# Patient Record
Sex: FEMALE | Race: WHITE | NOT HISPANIC OR LATINO | Employment: FULL TIME | URBAN - METROPOLITAN AREA
[De-identification: names, ages, dates, MRNs, and addresses within clinical notes are randomized per-mention and may not be internally consistent; named-entity substitution may affect disease eponyms.]

---

## 2017-04-04 ENCOUNTER — ALLSCRIPTS OFFICE VISIT (OUTPATIENT)
Dept: OTHER | Facility: OTHER | Age: 67
End: 2017-04-04

## 2017-05-02 ENCOUNTER — HOSPITAL ENCOUNTER (OUTPATIENT)
Dept: RADIOLOGY | Facility: CLINIC | Age: 67
Discharge: HOME | End: 2017-05-02
Payer: COMMERCIAL

## 2017-05-02 DIAGNOSIS — Z13.820 ENCOUNTER FOR SCREENING FOR OSTEOPOROSIS: ICD-10-CM

## 2017-05-02 DIAGNOSIS — Z12.31 ENCOUNTER FOR SCREENING MAMMOGRAM FOR HIGH-RISK PATIENT: ICD-10-CM

## 2017-05-02 PROCEDURE — G0202 SCR MAMMO BI INCL CAD: HCPCS

## 2017-05-02 PROCEDURE — 77080 DXA BONE DENSITY AXIAL: CPT

## 2017-11-08 ENCOUNTER — ALLSCRIPTS OFFICE VISIT (OUTPATIENT)
Dept: OTHER | Facility: OTHER | Age: 67
End: 2017-11-08

## 2017-11-08 ENCOUNTER — APPOINTMENT (OUTPATIENT)
Dept: RADIOLOGY | Facility: CLINIC | Age: 67
End: 2017-11-08
Payer: COMMERCIAL

## 2017-11-08 DIAGNOSIS — R10.2 PELVIC AND PERINEAL PAIN: ICD-10-CM

## 2017-11-08 PROCEDURE — 72170 X-RAY EXAM OF PELVIS: CPT

## 2017-11-10 NOTE — PROGRESS NOTES
Assessment    1  Pelvic pain in female (625 9) (R10 2)    Plan  Pelvic pain    · * XR PELVIS AP ONLY 1 OR 2 VIEW; Status:Active - Retrospective By ProtocolAuthorization; Requested MVQ:[id number];   Prakash Mcgrath does have pain directly over the pubic symphysis and has increased sclerosis on x-ray there  I am concerned about a pubic symphisitis  I would like to have an MRI to confirm this  We did talk about possible need for steroid injection to this region as well as the much less likely need for surgical intervention there  She will follow up after the MRI has been done and read  Discussion/Summary  The patient was counseled regarding diagnostic results,-- instructions for management,-- prognosis,-- patient and family education,-- impressions,-- risks and benefits of treatment options  Chief Complaint    1  Pelvic Pain    History of Present Illness  Prakash Mcgrath is a 52-year-old female who has been complaining about pelvic pain along the anterior aspect for the past 3 or 4 months  She does not recall any trauma  She is referred to see me today for the development of this pain  She did see her gynecologist who did an ultrasound that demonstrated no obvious abnormality into her region of the ovaries or other gynecologic problems  She states that the pain is a mild and dull ache that is intermittent  It radiates across the lower aspect of the abdomen  She does not know anything that makes it better per se although putting pressure along the lower abdomen at the pelvis does seem to bother her  She also has a history of cramps into her lower extremities  This is something that has been ongoing for quite some time and we did talk about some treatment methods for that  A would include appropriate hydration and adequate potassium intake and addition some quinine sulfate via tonic water to her nightly routine  If those do not help, we did talk about seeing her primary care physician for metabolic workup     Review of Systems   Constitutional: No fever, no chills, feels well, no tiredness, no recent weight gain or loss  Eyes: No complaints of eyesight problems, no red eyes  ENT: no loss of hearing, no nosebleeds, no sore throat  Cardiovascular: No complaints of chest pain, no palpitations, no leg claudication or lower extremity edema  Respiratory: no compliants of shortness of breath, no wheezing, no cough  Gastrointestinal: no complaints of abdominal pain, no constipation, no nausea or diarrhea, no vomiting, no bloody stools  Genitourinary: no complaints of dysuria, no incontinence  Musculoskeletal: as noted in HPI  Integumentary: no complaints of skin rash or lesion, no itching or dry skin, no skin wounds  Neurological: no complaints of headache, no confusion, no numbness or tingling, no dizziness  Endocrine: No complaints of muscle weakness, no feelings of weakness, no frequent urination, no excessive thirst   Psychiatric: No suicidal thoughts, no anxiety, no feelings of depression  Active Problems    1  Arthropathy (716 90) (M12 9)   2  Benign colon polyp (211 3) (K63 5)   3  BMI 26 0-26 9,adult (V85 22) (Z68 26)   4  Chest pain (786 50) (R07 9)   5  Colon, diverticulosis (562 10) (K57 30)   6  Constipation (564 00) (K59 00)   7  Encounter for screening mammogram for malignant neoplasm of breast (V76 12) (Z12 31)   8  GERD (gastroesophageal reflux disease) (530 81) (K21 9)   9  Internal Hemorrhoids (455 0)   10  Lumbago (724 2) (M54 5)   11  Mammogram abnormal (793 80) (R92 8)   12  Osteoarthrosis of hip and thigh region (715 95) (M16 9)   13  Pelvic pain (R10 2)   14  Residual hemorrhoidal skin tags (455 9) (K64 4)   15  Screening for genitourinary condition (V81 6) (Z13 89)   16  Seborrheic keratosis (702 19) (L82 1)   17  Trigger finger (727 03) (M65 30)   18   Uterovaginal prolapse (618 4) (N81 4)    Past Medical History   · Acute sinusitis (461 9) (J01 90)   · Acute upper respiratory infection (465 9) (J06 9)   · History of Bursitis of hip, unspecified laterality   · History of Concussion (V15 52)   · History of Contusion Of The Left Side Of The Face With Intact Skin Surface (920)   · Cough (786 2) (R05)   · Dog Bite (E906 0)   · History of acute sinusitis (V12 69) (Z87 09)   · History of backache (V13 59) (Z87 39)   · History of bursitis (V13 59) (Z87 39)   · History of Nonallopathic lesion of upper extremities (739 7) (M99 9)   · Open Wound Of The Hand (882 0)   · Well adult on routine health check (V70 0) (Z00 00)    The active problems and past medical history were reviewed and updated today  Surgical History   · History of Appendectomy   · History of Dilation And Curettage   · History of Hand Incision Tendon Sheath Of A Finger   · History of Tonsillectomy    The surgical history was reviewed and updated today  Family History  Mother    · No pertinent family history  Paternal Grandfather    · Family history of malignant neoplasm (V16 9) (Z80 9)  Maternal Aunt    · Family history of malignant neoplasm (V16 9) (Z80 9)  Maternal Uncle    · Family history of malignant neoplasm (V16 9) (Z80 9)    The family history was reviewed and updated today  Social History     · Denied: History of Alcohol use   · Never A Smoker  The social history was reviewed and updated today  Current Meds   1  Biotin TABS; Therapy: (Recorded:08Nov2017) to Recorded   2  Citracal TABS; Therapy: (Recorded:08Nov2017) to Recorded   3  Multiple Vitamin TABS; Therapy: (0472 51 11 42) to Recorded   4  Vitamin D3 CAPS; Therapy: (0472 51 11 42) to Recorded    The medication list was reviewed and updated today  Allergies    1  No Known Drug Allergies    Physical Exam    Right Hip: Appearance: Normal  Tenderness: pubic symphysis  ROM:  Full  Motor: Normal  Special Tests: negative Straight Leg Raise    Constitutional - General appearance: Normal   Musculoskeletal - Gait and station: Normal -- Digits and nails: Normal -- Muscle strength/tone: Normal -- Lower extremity compartments: Normal   Cardiovascular - Pulses: Normal -- Examination of extremities for edema and/or varicosities: Normal   Lymphatic - Palpation of lymph nodes in groin: Normal   Skin - Skin and subcutaneous tissue: Normal   Neurologic - Sensation: Normal -- Lower extremity peripheral neuro exam: Normal   Psychiatric - Orientation to person, place, and time: Normal -- Mood and affect: Normal   Eyes  Conjunctiva and lids: Normal    Pupils and irises: Normal        Results/Data  I personally reviewed the films/images/results in the office today  My interpretation follows  X-ray Review X-ray of the pelvis demonstrates increased sclerosis at the pubic symphysis        Signatures   Electronically signed by : RAKESH Kirklnad ; Nov 8 2017  5:20PM EST                       (Author)

## 2017-11-25 ENCOUNTER — HOSPITAL ENCOUNTER (OUTPATIENT)
Dept: RADIOLOGY | Facility: HOSPITAL | Age: 67
Discharge: HOME | End: 2017-11-25
Attending: ORTHOPAEDIC SURGERY
Payer: COMMERCIAL

## 2017-11-25 DIAGNOSIS — R10.2 PELVIC AND PERINEAL PAIN: ICD-10-CM

## 2017-11-25 PROCEDURE — 72195 MRI PELVIS W/O DYE: CPT

## 2018-01-13 NOTE — RESULT NOTES
Verified Results  * XR CHEST PA & LATERAL [id number] 10:58AM Lieutenant Harris     Test Name Result Flag Reference   XR CHEST PA & LATERAL (Report)     CHEST      INDICATION: Posterior right-sided chest pain  COMPARISON: 8/28/2008     VIEWS: Frontal and lateral projections; 2 images     FINDINGS:        Cardiomediastinal silhouette appears stable  The lungs are clear  No pneumothorax or pleural effusion  Visualized osseous structures appear within normal limits for the patient's age  IMPRESSION:     No active pulmonary disease  Workstation performed: [id number]     Signed by:    Heather Hooks MD   9/26/16       Discussion/Summary   Your chest xray is normal  - Dr Garcia Floyd

## 2018-01-14 VITALS
WEIGHT: 144 LBS | DIASTOLIC BLOOD PRESSURE: 70 MMHG | BODY MASS INDEX: 27.19 KG/M2 | SYSTOLIC BLOOD PRESSURE: 116 MMHG | TEMPERATURE: 99 F | RESPIRATION RATE: 14 BRPM | HEART RATE: 72 BPM | HEIGHT: 61 IN

## 2018-04-20 ENCOUNTER — TRANSCRIBE ORDERS (OUTPATIENT)
Dept: ADMINISTRATIVE | Facility: HOSPITAL | Age: 68
End: 2018-04-20

## 2018-04-20 DIAGNOSIS — Z12.39 BREAST SCREENING: Primary | ICD-10-CM

## 2018-05-03 ENCOUNTER — TRANSCRIBE ORDERS (OUTPATIENT)
Dept: ADMINISTRATIVE | Facility: HOSPITAL | Age: 68
End: 2018-05-03

## 2018-05-03 DIAGNOSIS — Z12.39 SCREENING BREAST EXAMINATION: Primary | ICD-10-CM

## 2018-05-07 ENCOUNTER — HOSPITAL ENCOUNTER (OUTPATIENT)
Dept: RADIOLOGY | Facility: HOSPITAL | Age: 68
Discharge: HOME | End: 2018-05-07
Payer: COMMERCIAL

## 2018-05-07 DIAGNOSIS — Z12.39 SCREENING BREAST EXAMINATION: ICD-10-CM

## 2018-05-07 PROCEDURE — 77067 SCR MAMMO BI INCL CAD: CPT

## 2018-05-07 PROCEDURE — 77063 BREAST TOMOSYNTHESIS BI: CPT

## 2018-07-12 ENCOUNTER — OFFICE VISIT (OUTPATIENT)
Dept: FAMILY MEDICINE CLINIC | Facility: CLINIC | Age: 68
End: 2018-07-12
Payer: COMMERCIAL

## 2018-07-12 VITALS
BODY MASS INDEX: 26.17 KG/M2 | WEIGHT: 138.6 LBS | HEART RATE: 76 BPM | DIASTOLIC BLOOD PRESSURE: 80 MMHG | HEIGHT: 61 IN | SYSTOLIC BLOOD PRESSURE: 120 MMHG | RESPIRATION RATE: 16 BRPM | TEMPERATURE: 98.1 F

## 2018-07-12 DIAGNOSIS — H81.10 BENIGN PAROXYSMAL POSITIONAL VERTIGO, UNSPECIFIED LATERALITY: Primary | ICD-10-CM

## 2018-07-12 DIAGNOSIS — K57.30 COLON, DIVERTICULOSIS: ICD-10-CM

## 2018-07-12 DIAGNOSIS — Z13.89 SCREENING FOR CARDIOVASCULAR, RESPIRATORY, AND GENITOURINARY DISEASES: ICD-10-CM

## 2018-07-12 DIAGNOSIS — Z13.6 SCREENING FOR CARDIOVASCULAR, RESPIRATORY, AND GENITOURINARY DISEASES: ICD-10-CM

## 2018-07-12 DIAGNOSIS — Z13.1 SCREENING FOR DIABETES MELLITUS (DM): ICD-10-CM

## 2018-07-12 DIAGNOSIS — M81.0 OSTEOPOROSIS, UNSPECIFIED OSTEOPOROSIS TYPE, UNSPECIFIED PATHOLOGICAL FRACTURE PRESENCE: ICD-10-CM

## 2018-07-12 DIAGNOSIS — R53.83 FATIGUE, UNSPECIFIED TYPE: ICD-10-CM

## 2018-07-12 DIAGNOSIS — Z13.83 SCREENING FOR CARDIOVASCULAR, RESPIRATORY, AND GENITOURINARY DISEASES: ICD-10-CM

## 2018-07-12 PROBLEM — R10.2 PELVIC PAIN IN FEMALE: Status: ACTIVE | Noted: 2017-11-08

## 2018-07-12 PROBLEM — M86.9 OSTEITIS PUBIS (HCC): Status: ACTIVE | Noted: 2017-12-12

## 2018-07-12 PROCEDURE — 3725F SCREEN DEPRESSION PERFORMED: CPT | Performed by: FAMILY MEDICINE

## 2018-07-12 PROCEDURE — 99214 OFFICE O/P EST MOD 30 MIN: CPT | Performed by: FAMILY MEDICINE

## 2018-07-12 RX ORDER — CYANOCOBALAMIN (VITAMIN B-12) 1000 MCG
TABLET, EXTENDED RELEASE ORAL
COMMUNITY

## 2018-07-12 RX ORDER — DIMENHYDRINATE 50 MG/1
50 TABLET ORAL
COMMUNITY
End: 2018-07-12

## 2018-07-12 RX ORDER — MECLIZINE HYDROCHLORIDE 25 MG/1
25 TABLET ORAL EVERY 8 HOURS PRN
Qty: 30 TABLET | Refills: 0 | Status: SHIPPED | OUTPATIENT
Start: 2018-07-12 | End: 2018-11-28

## 2018-07-12 RX ORDER — GLUCOSAMINE/D3/BOSWELLIA SERRA 1500MG-400
TABLET ORAL DAILY
COMMUNITY

## 2018-07-12 NOTE — PROGRESS NOTES
Assessment/Plan:    No problem-specific Assessment & Plan notes found for this encounter  She can contact me for referral to [facility] if no improvement with meds and time  Cranial nerves normal  hallpike equivocal  Diverticulosis stable  phm labs offered and cpe suggested  Osteoporosis advised, she was unaware, suggest weight bearing exercise and CA and D and q2y Dexa  IPPE suggested     Diagnoses and all orders for this visit:    Benign paroxysmal positional vertigo, unspecified laterality  -     meclizine (ANTIVERT) 25 mg tablet; Take 1 tablet (25 mg total) by mouth every 8 (eight) hours as needed for dizziness    Screening for diabetes mellitus (DM)  -     Comprehensive metabolic panel; Future    Screening for cardiovascular, respiratory, and genitourinary diseases  -     Lipid Panel with Direct LDL reflex; Future    Fatigue, unspecified type  -     TSH, 3rd generation; Future    Osteoporosis, unspecified osteoporosis type, unspecified pathological fracture presence    BMI 26 0-26 9,adult    Colon, diverticulosis    Other orders  -     Biotin 10 MG TABS; Take by mouth  -     Calcium Citrate-Vitamin D (CITRACAL MAXIMUM) 315-250 MG-UNIT TABS; Take by mouth  -     Multiple Vitamin-Folic Acid TABS; Take by mouth  -     Cholecalciferol (VITAMIN D3) 1000 units CAPS; Take by mouth  -     Discontinue: dimenhyDRINATE (DRAMAMINE) 50 mg tablet; Take 50 mg by mouth daily at bedtime as needed for movement disorders              Return if symptoms worsen or fail to improve, for welcome to medicare visit  Subjective:      Patient ID: Cyndi Rivera is a 78 y o  female      Chief Complaint   Patient presents with    Dizziness      for 4 days prcma    Nausea       HPI  Dizziness  Off balance  Nausea  Slight trouble walking  Ok laying down  Sitting still ok  Feels upon rising, then continuously  No vomit  Dramamine tried  Not groggy from it  Better then next am  Less severe  Better today and did not   No recent uri or travel or allergies or congestion  Not with rolling over  The following portions of the patient's history were reviewed and updated as appropriate: allergies, current medications, past family history, past medical history, past social history, past surgical history and problem list     Review of Systems   Constitutional: Negative for chills and fever  Cardiovascular: Negative for chest pain and palpitations  Neurological: Positive for dizziness  Negative for syncope, speech difficulty and weakness  Current Outpatient Prescriptions   Medication Sig Dispense Refill    Biotin 10 MG TABS Take by mouth      Calcium Citrate-Vitamin D (CITRACAL MAXIMUM) 315-250 MG-UNIT TABS Take by mouth      Cholecalciferol (VITAMIN D3) 1000 units CAPS Take by mouth      Multiple Vitamin-Folic Acid TABS Take by mouth      meclizine (ANTIVERT) 25 mg tablet Take 1 tablet (25 mg total) by mouth every 8 (eight) hours as needed for dizziness 30 tablet 0     No current facility-administered medications for this visit  Objective:    /80   Pulse 76   Temp 98 1 °F (36 7 °C)   Resp 16   Ht 5' 0 5" (1 537 m)   Wt 62 9 kg (138 lb 9 6 oz)   BMI 26 62 kg/m²        Physical Exam   Constitutional: She appears well-developed  No distress  HENT:   Head: Normocephalic  Mouth/Throat: No oropharyngeal exudate  Eyes: Conjunctivae are normal  No scleral icterus  Neck: Neck supple  Cardiovascular: Normal rate and intact distal pulses  Exam reveals no friction rub  No murmur heard  Pulmonary/Chest: Effort normal  No respiratory distress  She has no wheezes  Abdominal: Soft  She exhibits no distension  There is no tenderness  Musculoskeletal: She exhibits no edema or deformity  Lymphadenopathy:     She has no cervical adenopathy  Neurological: She is alert  She has normal reflexes  She displays normal reflexes  No cranial nerve deficit  She exhibits normal muscle tone   Coordination normal    Skin: Skin is warm and dry  No rash noted  No pallor  Psychiatric: Her behavior is normal  Thought content normal    Nursing note and vitals reviewed               Jalen Roberson DO

## 2018-07-19 ENCOUNTER — OFFICE VISIT (OUTPATIENT)
Dept: FAMILY MEDICINE CLINIC | Facility: CLINIC | Age: 68
End: 2018-07-19
Payer: COMMERCIAL

## 2018-07-19 ENCOUNTER — TELEPHONE (OUTPATIENT)
Dept: FAMILY MEDICINE CLINIC | Facility: CLINIC | Age: 68
End: 2018-07-19

## 2018-07-19 VITALS
HEART RATE: 72 BPM | SYSTOLIC BLOOD PRESSURE: 112 MMHG | DIASTOLIC BLOOD PRESSURE: 82 MMHG | BODY MASS INDEX: 27.09 KG/M2 | WEIGHT: 138 LBS | TEMPERATURE: 98.4 F | HEIGHT: 60 IN | RESPIRATION RATE: 16 BRPM

## 2018-07-19 DIAGNOSIS — R42 VERTIGO: Primary | ICD-10-CM

## 2018-07-19 PROCEDURE — 99214 OFFICE O/P EST MOD 30 MIN: CPT | Performed by: NURSE PRACTITIONER

## 2018-07-19 NOTE — TELEPHONE ENCOUNTER
Jeny Rhodes saw Dr Carmelina Anderson last week for disoriented, nauseas feeling  When she had apt her symptoms went away and now they are back  She feels like her insides are shaking and just not right  Before making an apt she wanted to ask Dr Carmelina Anderson if she should be seen?     Please call Jeny Rhodes at [phone]    [facility]

## 2018-07-19 NOTE — TELEPHONE ENCOUNTER
Pt sts was in last Thursday with Dr YAN, Monday into Tuesday patient was feeling disoriented and nauseous  Today patient feels her insides are shaking, not shaking on the outside  Unable to focus  Pt currently at [facility] house "just don't feel right "   Not SOB   Not nauseous not vomiting   Abdomin not distend, BM today  Appt made with Cher  Sending to review

## 2018-07-19 NOTE — PROGRESS NOTES
Assessment/Plan:  Total time of encounter was 30 minutes and 20 minutes was spent counseling  Will try antivert and will do blood work and will consider balance center if no improvement with antivert  Discussed in detail sign and symptoms for TIA and heart disease  Supportive care discussed and advised  Follow up for no improvement and worsening of conditions  Patient advised and educated when to see immediate medical care  Go to er for any chest pain, headache, shortness of breath, dizziness, weakness of extremities  Diagnoses and all orders for this visit:    Vertigo  Comments: Will start antivert and will do blood work  BMI 26 0-26 9,adult          Return if symptoms worsen or fail to improve  Subjective:      Patient ID: Mary Bernabe is a 78 y o  female  Chief Complaint   Patient presents with    Follow-up     not better feeling regan rosario lpn       HPI   Patient was seen on 7/12 by Dr Yomi Mckay for vertigo and was nauseated and stated that feels better than before but still feeling her balance is off and feels light headed  Stated that never started antivert and never went for the blood work  Denies any headache, dizziness, vision changed, hearing changes, chest pain and sob  Currently not on any prescription medication  Stated that lightheadedness does not relate to any position  Denies any airplane rides and cruise trips      The following portions of the patient's history were reviewed and updated as appropriate: allergies, current medications, past family history, past medical history, past social history, past surgical history and problem list       Review of Systems   Constitutional: Negative  HENT: Negative  Respiratory: Negative  Cardiovascular: Negative  Gastrointestinal: Negative  Genitourinary: Negative  Musculoskeletal: Negative  Skin: Negative  Neurological: Positive for light-headedness   Negative for dizziness, tremors, seizures, syncope, facial asymmetry, speech difficulty, weakness, numbness and headaches  Psychiatric/Behavioral: Negative  Objective:    History   Smoking Status    Never Smoker   Smokeless Tobacco    Never Used       Allergies: No Known Allergies    Vitals:  /82   Pulse 72   Temp 98 4 °F (36 9 °C)   Resp 16   Ht 5' 0 05" (1 525 m)   Wt 62 6 kg (138 lb)   BMI 26 91 kg/m²     Current Outpatient Prescriptions   Medication Sig Dispense Refill    Biotin 10 MG TABS Take by mouth      Calcium Citrate-Vitamin D (CITRACAL MAXIMUM) 315-250 MG-UNIT TABS Take by mouth      Cholecalciferol (VITAMIN D3) 1000 units CAPS Take by mouth      meclizine (ANTIVERT) 25 mg tablet Take 1 tablet (25 mg total) by mouth every 8 (eight) hours as needed for dizziness 30 tablet 0    Multiple Vitamin-Folic Acid TABS Take by mouth       No current facility-administered medications for this visit  Physical Exam   Constitutional: She is oriented to person, place, and time  She appears well-developed and well-nourished  HENT:   Head: Normocephalic  Right Ear: Tympanic membrane, external ear and ear canal normal    Left Ear: Tympanic membrane, external ear and ear canal normal    Nose: Nose normal  Right sinus exhibits no maxillary sinus tenderness and no frontal sinus tenderness  Left sinus exhibits no maxillary sinus tenderness and no frontal sinus tenderness  Mouth/Throat: Oropharynx is clear and moist and mucous membranes are normal    Eyes: Conjunctivae and lids are normal  Pupils are equal, round, and reactive to light  Neck: Normal range of motion  Neck supple  Cardiovascular: Normal rate, regular rhythm and normal heart sounds  Pulmonary/Chest: Effort normal and breath sounds normal    Abdominal: Soft  Bowel sounds are normal    Musculoskeletal: Normal range of motion  She exhibits no edema, tenderness or deformity     Lymphadenopathy:        Right cervical: No superficial cervical and no posterior cervical adenopathy present  Left cervical: No superficial cervical and no posterior cervical adenopathy present  Right: No inguinal and no supraclavicular adenopathy present  Left: No inguinal and no supraclavicular adenopathy present  Neurological: She is alert and oriented to person, place, and time  She has normal strength and normal reflexes  No cranial nerve deficit or sensory deficit  She displays a negative Romberg sign  Coordination and gait normal  GCS eye subscore is 4  GCS verbal subscore is 5  GCS motor subscore is 6  Verena Hallpike maneuver negative for nystagmus   Skin: Skin is warm and dry  Psychiatric: She has a normal mood and affect  Her speech is normal and behavior is normal  Judgment and thought content normal  Cognition and memory are normal    Vitals reviewed          Erminio Leventhal, CRNP

## 2018-07-19 NOTE — PATIENT INSTRUCTIONS
Will try antivert and will do blood work  Supportive care discussed and advised  Follow up for no improvement and worsening of conditions  Patient advised and educated when to see immediate medical care  Go to er for any chest pain, headache, shortness of breath, dizziness, weakness of extremities

## 2018-07-21 LAB
ALBUMIN SERPL-MCNC: 4.5 G/DL (ref 3.6–4.8)
ALBUMIN/GLOB SERPL: 1.7 {RATIO} (ref 1.2–2.2)
ALP SERPL-CCNC: 74 IU/L (ref 39–117)
ALT SERPL-CCNC: 17 IU/L (ref 0–32)
AST SERPL-CCNC: 19 IU/L (ref 0–40)
BILIRUB SERPL-MCNC: 0.4 MG/DL (ref 0–1.2)
BUN SERPL-MCNC: 26 MG/DL (ref 8–27)
BUN/CREAT SERPL: 30 (ref 12–28)
CALCIUM SERPL-MCNC: 9.7 MG/DL (ref 8.7–10.3)
CHLORIDE SERPL-SCNC: 102 MMOL/L (ref 96–106)
CHOLEST SERPL-MCNC: 254 MG/DL (ref 100–199)
CO2 SERPL-SCNC: 23 MMOL/L (ref 20–29)
CREAT SERPL-MCNC: 0.87 MG/DL (ref 0.57–1)
GLOBULIN SER-MCNC: 2.7 G/DL (ref 1.5–4.5)
GLUCOSE SERPL-MCNC: 89 MG/DL (ref 65–99)
HDLC SERPL-MCNC: 58 MG/DL
LDLC SERPL CALC-MCNC: 174 MG/DL (ref 0–99)
MICRODELETION SYND BLD/T FISH: NORMAL
POTASSIUM SERPL-SCNC: 4.8 MMOL/L (ref 3.5–5.2)
PROT SERPL-MCNC: 7.2 G/DL (ref 6–8.5)
SL AMB EGFR AFRICAN AMERICAN: 80 ML/MIN/1.73
SL AMB EGFR NON AFRICAN AMERICAN: 70 ML/MIN/1.73
SODIUM SERPL-SCNC: 143 MMOL/L (ref 134–144)
TRIGL SERPL-MCNC: 111 MG/DL (ref 0–149)
TSH SERPL DL<=0.005 MIU/L-ACNC: 3.86 UIU/ML (ref 0.45–4.5)

## 2018-07-24 DIAGNOSIS — E78.2 MIXED HYPERLIPIDEMIA: Primary | ICD-10-CM

## 2018-07-24 RX ORDER — ATORVASTATIN CALCIUM 20 MG/1
20 TABLET, FILM COATED ORAL DAILY
Qty: 30 TABLET | Refills: 2 | Status: SHIPPED | OUTPATIENT
Start: 2018-07-24 | End: 2019-01-02

## 2018-09-05 LAB
ALBUMIN SERPL-MCNC: 4.4 G/DL (ref 3.6–4.8)
ALBUMIN/GLOB SERPL: 1.7 {RATIO} (ref 1.2–2.2)
ALP SERPL-CCNC: 63 IU/L (ref 39–117)
ALT SERPL-CCNC: 17 IU/L (ref 0–32)
AST SERPL-CCNC: 23 IU/L (ref 0–40)
BILIRUB SERPL-MCNC: 0.2 MG/DL (ref 0–1.2)
BUN SERPL-MCNC: 21 MG/DL (ref 8–27)
BUN/CREAT SERPL: 28 (ref 12–28)
CALCIUM SERPL-MCNC: 9.7 MG/DL (ref 8.7–10.3)
CHLORIDE SERPL-SCNC: 102 MMOL/L (ref 96–106)
CO2 SERPL-SCNC: 22 MMOL/L (ref 20–29)
CREAT SERPL-MCNC: 0.76 MG/DL (ref 0.57–1)
GLOBULIN SER-MCNC: 2.6 G/DL (ref 1.5–4.5)
GLUCOSE SERPL-MCNC: 76 MG/DL (ref 65–99)
POTASSIUM SERPL-SCNC: 4.8 MMOL/L (ref 3.5–5.2)
PROT SERPL-MCNC: 7 G/DL (ref 6–8.5)
SL AMB EGFR AFRICAN AMERICAN: 95 ML/MIN/1.73
SL AMB EGFR NON AFRICAN AMERICAN: 82 ML/MIN/1.73
SODIUM SERPL-SCNC: 140 MMOL/L (ref 134–144)

## 2018-10-25 LAB
CHOLEST SERPL-MCNC: 251 MG/DL (ref 100–199)
HDLC SERPL-MCNC: 56 MG/DL
LDLC SERPL CALC-MCNC: 171 MG/DL (ref 0–99)
MICRODELETION SYND BLD/T FISH: NORMAL
TRIGL SERPL-MCNC: 119 MG/DL (ref 0–149)

## 2018-11-28 ENCOUNTER — OFFICE VISIT (OUTPATIENT)
Dept: OBGYN CLINIC | Facility: CLINIC | Age: 68
End: 2018-11-28
Payer: COMMERCIAL

## 2018-11-28 VITALS
WEIGHT: 132.8 LBS | SYSTOLIC BLOOD PRESSURE: 114 MMHG | HEART RATE: 66 BPM | HEIGHT: 61 IN | BODY MASS INDEX: 25.07 KG/M2 | DIASTOLIC BLOOD PRESSURE: 76 MMHG

## 2018-11-28 DIAGNOSIS — S39.012A STRAIN OF LUMBAR REGION, INITIAL ENCOUNTER: Primary | ICD-10-CM

## 2018-11-28 PROCEDURE — 99214 OFFICE O/P EST MOD 30 MIN: CPT | Performed by: ORTHOPAEDIC SURGERY

## 2018-11-28 NOTE — PROGRESS NOTES
Assessment/Plan:  1  Strain of lumbar region, initial encounter  Ambulatory referral to Physical Therapy       Armando Harris suffering with mechanical low back pain  I am reassured by the fact she has no neurologic complaints or exam findings today  We will start with conservative treatment for this  She was given a script for physical therapy we did advise her to do her exercises at home on her off days  If she fails to progress over the next 4-6 weeks she will certainly return  If she starts developing any pain, numbness, or weakness of the lower extremities she will call us immediately  Subjective:   Jesus Proctor is a 80 y o  female who presents today for evaluation of her low back  She states that about a week ago she started with right-sided low back pain which has persisted has not gotten significantly worse  She denies any injury prior to the onset of her symptoms  He she again notes right-sided low back pain which seems to be worse when she is sitting for prolonged  At times, like long car rides or at night  During the day when she is moving around she does not have much discomfort  She notes good range of motion of back and good strength  She denies any paresthesias or weakness of the lower extremities  She denies any bowel or bladder incontinence  Review of Systems   Constitutional: Negative for chills, fever and unexpected weight change  HENT: Negative for hearing loss, nosebleeds and sore throat  Eyes: Negative for pain, redness and visual disturbance  Respiratory: Negative for cough, shortness of breath and wheezing  Cardiovascular: Negative for chest pain, palpitations and leg swelling  Gastrointestinal: Negative for abdominal pain, nausea and vomiting  Endocrine: Negative for polydipsia and polyuria  Genitourinary: Negative for dysuria and hematuria  Musculoskeletal:        See HPI   Skin: Negative for rash and wound     Neurological: Negative for dizziness, numbness and headaches  Psychiatric/Behavioral: Negative for decreased concentration and suicidal ideas  The patient is not nervous/anxious  Past Medical History:   Diagnosis Date    Concussion     Last Assessed: 12/26/2013    Nonallopathic lesion of upper extremities 07/03/2008    Other bursitis of hip, unspecified hip 04/09/2008       Past Surgical History:   Procedure Laterality Date    APPENDECTOMY      DILATION AND CURETTAGE OF UTERUS      INCISION TENDON SHEATH HAND      Finger    TONSILLECTOMY         Family History   Problem Relation Age of Onset    No Known Problems Mother     Cancer Paternal Grandfather     Cancer Maternal Aunt     Cancer Maternal Uncle        Social History     Occupational History    Not on file  Social History Main Topics    Smoking status: Never Smoker    Smokeless tobacco: Never Used    Alcohol use Yes      Comment: rare / Denied history of use per [employer]    Drug use: No    Sexual activity: Not on file         Current Outpatient Prescriptions:     Biotin 10 MG TABS, Take by mouth, Disp: , Rfl:     Calcium Citrate-Vitamin D (CITRACAL MAXIMUM) 315-250 MG-UNIT TABS, Take by mouth, Disp: , Rfl:     Cholecalciferol (VITAMIN D3) 1000 units CAPS, Take by mouth, Disp: , Rfl:     Multiple Vitamin-Folic Acid TABS, Take by mouth, Disp: , Rfl:     atorvastatin (LIPITOR) 20 mg tablet, Take 1 tablet (20 mg total) by mouth daily for 90 days (Patient not taking: Reported on 11/28/2018 ), Disp: 30 tablet, Rfl: 2    No Known Allergies    Objective:  Vitals:    11/28/18 1443   BP: 114/76   Pulse: 66       Ortho Exam    Physical Exam   Constitutional: She is oriented to person, place, and time  She appears well-developed and well-nourished  No distress  HENT:   Head: Normocephalic and atraumatic  Eyes: Conjunctivae and EOM are normal  No scleral icterus  Neck: No JVD present  Cardiovascular: Normal rate and intact distal pulses      Pulmonary/Chest: Effort normal  No respiratory distress  Abdominal: Soft  She exhibits no distension  Neurological: She is alert and oriented to person, place, and time  Coordination normal    Skin: Skin is warm  Psychiatric: She has a normal mood and affect  Lumbar spine:   Right paraspinal tenderness  No midline tenderness to the lumbar spine or left paraspinal tenderness  Full range of motion back  Sensation intact L2 through S1 dermatomes bilaterally  5/5 strength lower extremities

## 2019-01-02 ENCOUNTER — OFFICE VISIT (OUTPATIENT)
Dept: FAMILY MEDICINE CLINIC | Facility: CLINIC | Age: 69
End: 2019-01-02
Payer: COMMERCIAL

## 2019-01-02 VITALS
HEIGHT: 61 IN | RESPIRATION RATE: 16 BRPM | BODY MASS INDEX: 24.51 KG/M2 | SYSTOLIC BLOOD PRESSURE: 120 MMHG | HEART RATE: 66 BPM | WEIGHT: 129.8 LBS | DIASTOLIC BLOOD PRESSURE: 82 MMHG | TEMPERATURE: 97.7 F

## 2019-01-02 DIAGNOSIS — Z91.89 FRAMINGHAM CARDIAC RISK 10-20% IN NEXT 10 YEARS: ICD-10-CM

## 2019-01-02 DIAGNOSIS — H81.10 BENIGN PAROXYSMAL POSITIONAL VERTIGO, UNSPECIFIED LATERALITY: ICD-10-CM

## 2019-01-02 DIAGNOSIS — Z00.00 MEDICARE ANNUAL WELLNESS VISIT, INITIAL: Primary | ICD-10-CM

## 2019-01-02 DIAGNOSIS — J01.00 ACUTE NON-RECURRENT MAXILLARY SINUSITIS: ICD-10-CM

## 2019-01-02 DIAGNOSIS — M81.0 OSTEOPOROSIS, UNSPECIFIED OSTEOPOROSIS TYPE, UNSPECIFIED PATHOLOGICAL FRACTURE PRESENCE: ICD-10-CM

## 2019-01-02 PROCEDURE — G0438 PPPS, INITIAL VISIT: HCPCS | Performed by: FAMILY MEDICINE

## 2019-01-02 PROCEDURE — 99214 OFFICE O/P EST MOD 30 MIN: CPT | Performed by: FAMILY MEDICINE

## 2019-01-02 PROCEDURE — 1170F FXNL STATUS ASSESSED: CPT | Performed by: FAMILY MEDICINE

## 2019-01-02 PROCEDURE — 1125F AMNT PAIN NOTED PAIN PRSNT: CPT | Performed by: FAMILY MEDICINE

## 2019-01-02 RX ORDER — AMOXICILLIN AND CLAVULANATE POTASSIUM 875; 125 MG/1; MG/1
1 TABLET, FILM COATED ORAL EVERY 12 HOURS SCHEDULED
Qty: 20 TABLET | Refills: 0 | Status: SHIPPED | OUTPATIENT
Start: 2019-01-02 | End: 2019-01-12

## 2019-01-02 RX ORDER — BENZONATATE 200 MG/1
200 CAPSULE ORAL 3 TIMES DAILY PRN
Qty: 30 CAPSULE | Refills: 0 | Status: SHIPPED | OUTPATIENT
Start: 2019-01-02 | End: 2020-04-26

## 2019-01-02 NOTE — PROGRESS NOTES
Assessment/Plan:    No problem-specific Assessment & Plan notes found for this encounter  Sinusitis new  framingham score ok w/o statins  Diet for chol advised, f/u yearly  BPPV, had extensive testing in [facility], cardiac and CT head imaging and CTA, all neg per pt, offer  balance center vs ENT f/u that was recommended by ER  Osteoporosis noted on last dexa, due this year for 2y f/u advised, Ca/D/activity suggested  mammo utd, due in May 2019     Diagnoses and all orders for this visit:    Medicare annual wellness visit, initial    [place] cardiac risk 10-20% in next 10 years    Acute non-recurrent maxillary sinusitis  -     amoxicillin-clavulanate (AUGMENTIN) 875-125 mg per tablet; Take 1 tablet by mouth every 12 (twelve) hours for 10 days  -     benzonatate (TESSALON) 200 MG capsule; Take 1 capsule (200 mg total) by mouth 3 (three) times a day as needed for cough    Benign paroxysmal positional vertigo, unspecified laterality  -     Ambulatory referral to Physical Therapy; Future    Osteoporosis, unspecified osteoporosis type, unspecified pathological fracture presence              Return if symptoms worsen or fail to improve  Subjective:      Patient ID: Rex Lauren is a 80 y o  female  Chief Complaint   Patient presents with    Cough     prcma       HPI  Cough  Can't stop  Day and night  Over 4d  Irritating  Sinus congestion  Right neck sore/gland  No fever  Mucus is green  mucinex during day  Better then worse  nyquill  No one else sick at home  No c/d/n/v  Not sleeping well    osteoporosis on dexa May 2017  mammo utd May 2018    Declines flu shot     The following portions of the patient's history were reviewed and updated as appropriate: allergies, current medications, past family history, past medical history, past social history, past surgical history and problem list     Review of Systems   Cardiovascular: Negative for chest pain  Gastrointestinal: Negative for bowel incontinence  Neurological: Negative for syncope  Psychiatric/Behavioral: The patient is not nervous/anxious  Current Outpatient Prescriptions   Medication Sig Dispense Refill    Biotin 10 MG TABS Take by mouth      Calcium Citrate-Vitamin D (CITRACAL MAXIMUM) 315-250 MG-UNIT TABS Take by mouth      Cholecalciferol (VITAMIN D3) 1000 units CAPS Take by mouth      Multiple Vitamin-Folic Acid TABS Take by mouth      amoxicillin-clavulanate (AUGMENTIN) 875-125 mg per tablet Take 1 tablet by mouth every 12 (twelve) hours for 10 days 20 tablet 0    benzonatate (TESSALON) 200 MG capsule Take 1 capsule (200 mg total) by mouth 3 (three) times a day as needed for cough 30 capsule 0     No current facility-administered medications for this visit  Objective:    /82   Pulse 66   Temp 97 7 °F (36 5 °C)   Resp 16   Ht 5' 1" (1 549 m)   Wt 58 9 kg (129 lb 12 8 oz)   BMI 24 53 kg/m²        Physical Exam   Constitutional: She appears well-developed  No distress  HENT:   Head: Normocephalic  Mouth/Throat: No oropharyngeal exudate  Sinuses tender to percussion, nasal turbinates visualized and appear red and swollen     Eyes: Conjunctivae are normal  No scleral icterus  Neck: Neck supple  Carotid bruit is not present  Cardiovascular: Normal rate and intact distal pulses  No murmur heard  Pulmonary/Chest: Effort normal  No respiratory distress  She has no wheezes  Abdominal: Soft  She exhibits no distension  There is no tenderness  Musculoskeletal: She exhibits no edema or deformity  Lymphadenopathy:     She has no cervical adenopathy  Neurological: She is alert  She exhibits normal muscle tone  Skin: Skin is warm and dry  No rash noted  No pallor  Psychiatric: Her behavior is normal  Thought content normal    Nursing note and vitals reviewed               Garry Duane, DO  Assessment and Plan:    Problem List Items Addressed This Visit     Acute non-recurrent maxillary sinusitis Relevant Medications    amoxicillin-clavulanate (AUGMENTIN) 875-125 mg per tablet    benzonatate (TESSALON) 200 MG capsule    Benign paroxysmal positional vertigo    Relevant Orders    Ambulatory referral to Physical Therapy    [place] cardiac risk 10-20% in next 10 years    Osteoporosis      Other Visit Diagnoses     Medicare annual wellness visit, initial    -  Primary        Health Maintenance Due   Topic Date Due    Hepatitis C Screening  1950    Medicare Annual Wellness Visit (AWV)  1950    DTaP,Tdap,and Td Vaccines (1 - Tdap) 11/06/1972    Pneumococcal PPSV23/PCV13 65+ Years / High and Highest Risk (1 of 2 - PCV13) 11/06/2016    INFLUENZA VACCINE  07/01/2018         HPI:  Piyush Khan is a 80 y o  female here for her Subsequent Wellness Visit      Patient Active Problem List   Diagnosis    Arthropathy    Benign colon polyp    Colon, diverticulosis    Constipation    Internal hemorrhoids    Low back pain    Osteitis pubis (HCC)    Osteoarthritis of pelvis    Pelvic pain in female    Residual hemorrhoidal skin tags    Uterovaginal prolapse    Benign paroxysmal positional vertigo    Screening for diabetes mellitus (DM)    Screening for cardiovascular, respiratory, and genitourinary diseases    Fatigue    Osteoporosis    BMI 26 0-26 9,adult    Mixed hyperlipidemia    [place] cardiac risk 10-20% in next 10 years    Acute non-recurrent maxillary sinusitis     Past Medical History:   Diagnosis Date    Concussion     Last Assessed: 12/26/2013    Nonallopathic lesion of upper extremities 07/03/2008    Other bursitis of hip, unspecified hip 04/09/2008     Past Surgical History:   Procedure Laterality Date    APPENDECTOMY      DILATION AND CURETTAGE OF UTERUS      INCISION TENDON SHEATH HAND      Finger    TONSILLECTOMY       Family History   Problem Relation Age of Onset    No Known Problems Mother     Cancer Paternal Grandfather     Cancer Maternal Aunt     Cancer Maternal Uncle      History   Smoking Status    Never Smoker   Smokeless Tobacco    Never Used     History   Alcohol Use    Yes     Comment: rare / Denied history of use per [employer]      History   Drug Use No       Current Outpatient Prescriptions   Medication Sig Dispense Refill    Biotin 10 MG TABS Take by mouth      Calcium Citrate-Vitamin D (CITRACAL MAXIMUM) 315-250 MG-UNIT TABS Take by mouth      Cholecalciferol (VITAMIN D3) 1000 units CAPS Take by mouth      Multiple Vitamin-Folic Acid TABS Take by mouth      amoxicillin-clavulanate (AUGMENTIN) 875-125 mg per tablet Take 1 tablet by mouth every 12 (twelve) hours for 10 days 20 tablet 0    benzonatate (TESSALON) 200 MG capsule Take 1 capsule (200 mg total) by mouth 3 (three) times a day as needed for cough 30 capsule 0     No current facility-administered medications for this visit  No Known Allergies    There is no immunization history on file for this patient  Patient Care Team:  Sarah Baumann DO as PCP - General    Medicare Screening Tests and Risk Assessments:  Octavio Amin is here for her Initial Wellness visit  Health Risk Assessment:  Patient rates overall health as excellent  Patient feels that their physical health rating is Much better  Eyesight was rated as Same  Hearing was rated as Same  Patient feels that their emotional and mental health rating is Much better  Pain experienced by patient in the last 7 days has been None  Patient states that she has experienced no weight loss or gain in last 6 months  Emotional/Mental Health:  Patient has been feeling nervous/anxious  PHQ-9 Depression Screening:    Frequency of the following problems over the past two weeks:      1  Little interest or pleasure in doing things: 0 - not at all      2  Feeling down, depressed, or hopeless: 0 - not at all  PHQ-2 Score: 0          Broken Bones/Falls:     Fall Risk Assessment:    In the past year, patient has experienced: No history of falling in past year          Bladder/Bowel:  Patient has not leaked urine accidently in the last six months  Patient reports no loss of bowel control  Immunizations:  Patient has not had a flu vaccination within the last year  Patient has not received a pneumonia shot  Patient has not received a shingles shot  Patient has received tetanus/diphtheria shot  Home Safety:  Patient does not have trouble with stairs inside or outside of their home  Patient currently reports that there are no safety hazards present in home, working smoke alarms, working carbon monoxide detectors  Preventative Screenings:   Breast cancer screening performed, colon cancer screen completed, cholesterol screen completed, glaucoma eye exam completed,     Nutrition:  Current diet: Regular and Limited junk food with servings of the following:    Medications:  Patient is currently taking over-the-counter supplements  List of OTC medications includes: mvi  Patient is able to manage medications  Lifestyle Choices:  Patient reports no tobacco use  Patient has not smoked or used tobacco in the past   Patient reports no alcohol use  Patient drives a vehicle  Patient wears seat belt  Current level of exercise of physical activity described by patient as: none  Activities of Daily Living:  Can get out of bed by his or her self, able to dress self, able to make own meals, able to do own shopping, able to bathe self, can do own laundry/housekeeping, can manage own money, pay bills and track expenses    Previous Hospitalizations:  No hospitalization or ED visit in past 12 months        Advanced Directives:  Patient has decided on a power of   Patient has spoken to designated power of   Patient has not completed advanced directive          Preventative Screening/Counseling:      Cardiovascular:      General: Risks and Benefits Discussed          Diabetes:      General: Risks and Benefits Discussed Colorectal Cancer:      General: Risks and Benefits Discussed and Screening Current          Breast Cancer:      General: Risks and Benefits Discussed and Screening Current          Cervical Cancer:      General: Risks and Benefits Discussed          Osteoporosis:      General: Risks and Benefits Discussed and Screening Current          AAA:      General: Screening Not Indicated          Glaucoma:      General: Risks and Benefits Discussed          HIV:      General: Screening Not Indicated          Hepatitis C:      General: Risks and Benefits Discussed        Advanced Directives:   Patient has living will for healthcare, No end of life assessment reviewed with patient

## 2019-01-17 ENCOUNTER — OFFICE VISIT (OUTPATIENT)
Dept: FAMILY MEDICINE CLINIC | Facility: CLINIC | Age: 69
End: 2019-01-17
Payer: COMMERCIAL

## 2019-01-17 VITALS
DIASTOLIC BLOOD PRESSURE: 86 MMHG | HEART RATE: 96 BPM | BODY MASS INDEX: 24.32 KG/M2 | SYSTOLIC BLOOD PRESSURE: 142 MMHG | HEIGHT: 61 IN | WEIGHT: 128.8 LBS | TEMPERATURE: 101 F | RESPIRATION RATE: 20 BRPM

## 2019-01-17 DIAGNOSIS — J01.90 ACUTE SINUSITIS, RECURRENCE NOT SPECIFIED, UNSPECIFIED LOCATION: Primary | ICD-10-CM

## 2019-01-17 DIAGNOSIS — R68.89 FLU-LIKE SYMPTOMS: ICD-10-CM

## 2019-01-17 PROCEDURE — 99213 OFFICE O/P EST LOW 20 MIN: CPT | Performed by: NURSE PRACTITIONER

## 2019-01-17 RX ORDER — DEXTROMETHORPHAN HYDROBROMIDE AND PROMETHAZINE HYDROCHLORIDE 15; 6.25 MG/5ML; MG/5ML
5 SYRUP ORAL 4 TIMES DAILY PRN
Qty: 118 ML | Refills: 0 | Status: SHIPPED | OUTPATIENT
Start: 2019-01-17 | End: 2020-01-21

## 2019-01-17 RX ORDER — DOXYCYCLINE HYCLATE 100 MG/1
100 CAPSULE ORAL EVERY 12 HOURS SCHEDULED
Qty: 20 CAPSULE | Refills: 0 | Status: SHIPPED | OUTPATIENT
Start: 2019-01-17 | End: 2019-01-27

## 2019-01-17 RX ORDER — OSELTAMIVIR PHOSPHATE 75 MG/1
75 CAPSULE ORAL 2 TIMES DAILY
Qty: 10 CAPSULE | Refills: 0 | Status: SHIPPED | OUTPATIENT
Start: 2019-01-17 | End: 2019-01-22

## 2019-01-17 NOTE — PATIENT INSTRUCTIONS
Discussed side effects of Tamiflu including behavioral disturbances and self injury  Take medication with food  It is important that you take the entire course of antibiotics prescribed  May also take a probiotic of your choice to maintain healthy GI sherron  Can take some probiotic and yogurt with the medication  Supportive care discussed and advised  Follow up for no improvement and worsening of conditions  Patient advised and educated when to see immediate medical care

## 2019-01-17 NOTE — PROGRESS NOTES
Assessment/Plan:         Diagnoses and all orders for this visit:    Acute sinusitis, recurrence not specified, unspecified location  -     doxycycline hyclate (VIBRAMYCIN) 100 mg capsule; Take 1 capsule (100 mg total) by mouth every 12 (twelve) hours for 10 days  -     promethazine-dextromethorphan (PHENERGAN-DM) 6 25-15 mg/5 mL oral syrup; Take 5 mL by mouth 4 (four) times a day as needed for cough    Flu-like symptoms  -     oseltamivir (TAMIFLU) 75 mg capsule; Take 1 capsule (75 mg total) by mouth 2 (two) times a day for 5 days            Patient Instructions:  Discussed side effects of Tamiflu including behavioral disturbances and self injury  Take medication with food  It is important that you take the entire course of antibiotics prescribed  May also take a probiotic of your choice to maintain healthy GI sherron  Can take some probiotic and yogurt with the medication  Supportive care discussed and advised  Follow up for no improvement and worsening of conditions  Patient advised and educated when to see immediate medical care  Return if symptoms worsen or fail to improve  Subjective:      Patient ID: Carmen Arriaga is a 80 y o  female  Chief Complaint   Patient presents with    Cough    Headache    Nasal Congestion    Sinus Problem    chest congestion     [facility]       HPI  Patient was seen in office about 2 weeks ago and was treated with Augmentin for sinus congestion  Stated that was getting better and suddenly last night got worse with congestion and today morning feeling right sided neck pain, discomoft behind eyes, fatigue and having fever  Denies sob and chest pain  Currently not taking any medications      The following portions of the patient's history were reviewed and updated as appropriate: allergies, current medications, past family history, past medical history, past social history, past surgical history and problem list       Review of Systems   Constitutional: Positive for fever  Negative for chills and fatigue  HENT: Positive for congestion  Negative for ear discharge, ear pain, facial swelling, hearing loss, mouth sores, nosebleeds, postnasal drip, rhinorrhea, sinus pain, sinus pressure, sneezing, sore throat, trouble swallowing and voice change  Respiratory: Positive for cough  Negative for chest tightness, shortness of breath and wheezing  Cardiovascular: Negative  Gastrointestinal: Negative for abdominal pain, constipation, diarrhea and nausea  Neurological: Negative for dizziness, weakness, light-headedness and headaches  Objective:    History   Smoking Status    Never Smoker   Smokeless Tobacco    Never Used       Allergies: No Known Allergies    Vitals:  /86   Pulse 96   Temp (!) 101 °F (38 3 °C)   Resp 20   Ht 5' 1" (1 549 m)   Wt 58 4 kg (128 lb 12 8 oz)   BMI 24 34 kg/m²     Current Outpatient Prescriptions   Medication Sig Dispense Refill    benzonatate (TESSALON) 200 MG capsule Take 1 capsule (200 mg total) by mouth 3 (three) times a day as needed for cough 30 capsule 0    Biotin 10 MG TABS Take by mouth      Calcium Citrate-Vitamin D (CITRACAL MAXIMUM) 315-250 MG-UNIT TABS Take by mouth      Cholecalciferol (VITAMIN D3) 1000 units CAPS Take by mouth      Multiple Vitamin-Folic Acid TABS Take by mouth      doxycycline hyclate (VIBRAMYCIN) 100 mg capsule Take 1 capsule (100 mg total) by mouth every 12 (twelve) hours for 10 days 20 capsule 0    oseltamivir (TAMIFLU) 75 mg capsule Take 1 capsule (75 mg total) by mouth 2 (two) times a day for 5 days 10 capsule 0    promethazine-dextromethorphan (PHENERGAN-DM) 6 25-15 mg/5 mL oral syrup Take 5 mL by mouth 4 (four) times a day as needed for cough 118 mL 0     No current facility-administered medications for this visit  Physical Exam   Constitutional: She is oriented to person, place, and time  She appears well-developed and well-nourished     HENT:   Head: Normocephalic  Right Ear: Tympanic membrane, external ear and ear canal normal    Left Ear: Tympanic membrane, external ear and ear canal normal    Nose: Mucosal edema present  Right sinus exhibits no maxillary sinus tenderness and no frontal sinus tenderness  Left sinus exhibits no maxillary sinus tenderness and no frontal sinus tenderness  Mouth/Throat: Oropharynx is clear and moist and mucous membranes are normal    Neck: Neck supple  Cardiovascular: Normal rate, regular rhythm and normal heart sounds  Pulmonary/Chest: Effort normal and breath sounds normal    Abdominal: Normal appearance and bowel sounds are normal  There is no hepatosplenomegaly  There is no tenderness  There is no rebound  Musculoskeletal: Normal range of motion  Lymphadenopathy:        Right cervical: No superficial cervical and no posterior cervical adenopathy present  Left cervical: No superficial cervical and no posterior cervical adenopathy present  Neurological: She is alert and oriented to person, place, and time  Skin: Skin is warm and dry  Psychiatric: She has a normal mood and affect  Her behavior is normal  Judgment and thought content normal    Vitals reviewed                    GILBERT Cramer

## 2020-01-10 ENCOUNTER — TELEPHONE (OUTPATIENT)
Dept: FAMILY MEDICINE CLINIC | Facility: CLINIC | Age: 70
End: 2020-01-10

## 2020-01-10 DIAGNOSIS — Z12.39 SCREENING FOR BREAST CANCER: Primary | ICD-10-CM

## 2020-01-21 ENCOUNTER — OFFICE VISIT (OUTPATIENT)
Dept: FAMILY MEDICINE CLINIC | Facility: CLINIC | Age: 70
End: 2020-01-21
Payer: COMMERCIAL

## 2020-01-21 VITALS
HEIGHT: 60 IN | SYSTOLIC BLOOD PRESSURE: 110 MMHG | HEART RATE: 64 BPM | WEIGHT: 138 LBS | DIASTOLIC BLOOD PRESSURE: 70 MMHG | OXYGEN SATURATION: 97 % | BODY MASS INDEX: 27.09 KG/M2 | TEMPERATURE: 99.4 F | RESPIRATION RATE: 16 BRPM

## 2020-01-21 DIAGNOSIS — Z13.6 SCREENING FOR CARDIOVASCULAR, RESPIRATORY, AND GENITOURINARY DISEASES: ICD-10-CM

## 2020-01-21 DIAGNOSIS — Z13.89 SCREENING FOR CARDIOVASCULAR, RESPIRATORY, AND GENITOURINARY DISEASES: ICD-10-CM

## 2020-01-21 DIAGNOSIS — M86.9 OSTEITIS PUBIS (HCC): ICD-10-CM

## 2020-01-21 DIAGNOSIS — R53.83 FATIGUE, UNSPECIFIED TYPE: Primary | ICD-10-CM

## 2020-01-21 DIAGNOSIS — Z13.1 SCREENING FOR DIABETES MELLITUS (DM): ICD-10-CM

## 2020-01-21 DIAGNOSIS — Z13.83 SCREENING FOR CARDIOVASCULAR, RESPIRATORY, AND GENITOURINARY DISEASES: ICD-10-CM

## 2020-01-21 DIAGNOSIS — R41.3 MEMORY DIFFICULTY: ICD-10-CM

## 2020-01-21 PROCEDURE — 99214 OFFICE O/P EST MOD 30 MIN: CPT | Performed by: FAMILY MEDICINE

## 2020-01-21 PROCEDURE — 3725F SCREEN DEPRESSION PERFORMED: CPT | Performed by: FAMILY MEDICINE

## 2020-01-21 NOTE — PROGRESS NOTES
Assessment/Plan:    No problem-specific Assessment & Plan notes found for this encounter  Memory w/u  Consider neurology referral after labs  Leg cramps, suggest exercise, check labs, mvi, drink enough water  Fatigue, labs     Diagnoses and all orders for this visit:    Fatigue, unspecified type  -     CBC and differential; Future  -     TSH, 3rd generation; Future  -     Vitamin D 25 hydroxy; Future  -     Vitamin B12; Future  -     Sedimentation rate, automated; Future  -     RPR, Rfx Qn RPR/Confirm TP; Future  -     Lyme Antibody Profile with reflex to WB; Future  -     LUCI Screen w/ Reflex to Titer/Pattern; Future  -     C-reactive protein; Future    Screening for cardiovascular, respiratory, and genitourinary diseases  -     Lipid Panel with Direct LDL reflex; Future    Screening for diabetes mellitus (DM)  -     Comprehensive metabolic panel; Future    Memory difficulty  -     Vitamin B12; Future  -     Ambulatory referral to Neurology; Future    Osteitis pubis (HCC)        Return if symptoms worsen or fail to improve  Subjective:      Patient ID: Elvira Strickland is a 77 y o  female  Chief Complaint   Patient presents with    Memory Loss     wants a referral for neurology jlopezcma        HPI  Daughter feels memory bad, long time  Pt thinks she is fine  Games and crosswords ok  Works still, no problems  No fam hx of dementia    mvi  No tob/etoh  Ca and D taken    No wt loss surgery    Bowels normal    No dep/anxiety symptoms per pt    The following portions of the patient's history were reviewed and updated as appropriate: allergies, current medications, past family history, past medical history, past social history, past surgical history and problem list     Review of Systems   Respiratory: Negative for shortness of breath  Cardiovascular: Negative for chest pain  Skin: Negative for rash           Current Outpatient Medications   Medication Sig Dispense Refill    Biotin 10 MG TABS Take by mouth  Calcium Citrate-Vitamin D (CITRACAL MAXIMUM) 315-250 MG-UNIT TABS Take by mouth      Cholecalciferol (VITAMIN D3) 1000 units CAPS Take by mouth      Multiple Vitamin-Folic Acid TABS Take by mouth      benzonatate (TESSALON) 200 MG capsule Take 1 capsule (200 mg total) by mouth 3 (three) times a day as needed for cough (Patient not taking: Reported on 1/21/2020) 30 capsule 0     No current facility-administered medications for this visit  Objective:    /70   Pulse 64   Temp 99 4 °F (37 4 °C)   Resp 16   Ht 5' (1 524 m)   Wt 62 6 kg (138 lb)   SpO2 97%   BMI 26 95 kg/m²        Physical Exam   Constitutional: She appears well-developed  No distress  HENT:   Head: Normocephalic  Right Ear: External ear normal    Left Ear: External ear normal    Nose: Nose normal    Mouth/Throat: Oropharynx is clear and moist  No oropharyngeal exudate  Eyes: Conjunctivae are normal  Right eye exhibits no discharge  Left eye exhibits no discharge  No scleral icterus  Neck: Neck supple  No thyromegaly present  Cardiovascular: Normal rate, regular rhythm, normal heart sounds and intact distal pulses  No murmur heard  Pulmonary/Chest: Effort normal and breath sounds normal  No respiratory distress  She has no wheezes  She has no rales  Abdominal: Soft  Bowel sounds are normal  She exhibits no mass  There is no tenderness  There is no guarding  Musculoskeletal: She exhibits no edema or deformity  Lymphadenopathy:     She has no cervical adenopathy  Neurological: She is alert  She displays normal reflexes  No cranial nerve deficit  She exhibits normal muscle tone  Coordination normal    Skin: Skin is warm and dry  No rash noted  Psychiatric: Her behavior is normal  Thought content normal    Nursing note and vitals reviewed  BMI Counseling: Body mass index is 26 95 kg/m²  The BMI is above normal  Nutrition recommendations include moderation in carbohydrate intake   No pharmacotherapy was ordered  Falls Plan of Care: balance, strength, and gait training instructions were provided           Fidencio Gonzalez DO

## 2020-01-22 ENCOUNTER — TELEPHONE (OUTPATIENT)
Dept: NEUROLOGY | Facility: CLINIC | Age: 70
End: 2020-01-22

## 2020-01-22 NOTE — TELEPHONE ENCOUNTER
Best contact number for patient: [phone]     Emergency Contact name and number:    Referring provider:    Referring provider Telephone:________________    Primary Care Provider Name:     Reason for Appointment/Dx: MEMORY loss     Neurology Location patient would like to be seen:    Order received? Yes                                                 Records Received? Yes    Have you ever seen another Neurologist? No    Insurance Information    Insurance Name: Evelio Martell complete   ID/Policy #:    Secondary Insurance:    ID/Policy#: Workman's Comp/ Accident/ School  Information      Workman's Comp/Accident/School related?  No    If yes name of Insurance company:    Date of Injury:    Open Claim:no    [address] Name and Telephone Number:    Notes:                   Appointment date: ___________05/12/2020__________________

## 2020-01-27 LAB
25(OH)D3+25(OH)D2 SERPL-MCNC: 77.5 NG/ML (ref 30–100)
ALBUMIN SERPL-MCNC: 4.6 G/DL (ref 3.8–4.8)
ALBUMIN/GLOB SERPL: 1.8 {RATIO} (ref 1.2–2.2)
ALP SERPL-CCNC: 69 IU/L (ref 39–117)
ALT SERPL-CCNC: 20 IU/L (ref 0–32)
ANA TITR SER IF: NEGATIVE {TITER}
AST SERPL-CCNC: 20 IU/L (ref 0–40)
B BURGDOR IGG+IGM SER-ACNC: <0.91 ISR (ref 0–0.9)
B BURGDOR IGM SER IA-ACNC: <0.8 INDEX (ref 0–0.79)
BASOPHILS # BLD AUTO: 0 X10E3/UL (ref 0–0.2)
BASOPHILS NFR BLD AUTO: 0 %
BILIRUB SERPL-MCNC: 0.3 MG/DL (ref 0–1.2)
BUN SERPL-MCNC: 19 MG/DL (ref 8–27)
BUN/CREAT SERPL: 23 (ref 12–28)
CALCIUM SERPL-MCNC: 9.6 MG/DL (ref 8.7–10.3)
CHLORIDE SERPL-SCNC: 100 MMOL/L (ref 96–106)
CHOLEST SERPL-MCNC: 252 MG/DL (ref 100–199)
CO2 SERPL-SCNC: 22 MMOL/L (ref 20–29)
CREAT SERPL-MCNC: 0.82 MG/DL (ref 0.57–1)
CRP SERPL-MCNC: <1 MG/L (ref 0–10)
EOSINOPHIL # BLD AUTO: 0 X10E3/UL (ref 0–0.4)
EOSINOPHIL NFR BLD AUTO: 1 %
ERYTHROCYTE [DISTWIDTH] IN BLOOD BY AUTOMATED COUNT: 13.7 % (ref 11.7–15.4)
ERYTHROCYTE [SEDIMENTATION RATE] IN BLOOD BY WESTERGREN METHOD: 17 MM/HR (ref 0–40)
GLOBULIN SER-MCNC: 2.6 G/DL (ref 1.5–4.5)
GLUCOSE SERPL-MCNC: 92 MG/DL (ref 65–99)
HCT VFR BLD AUTO: 38.3 % (ref 34–46.6)
HDLC SERPL-MCNC: 67 MG/DL
HGB BLD-MCNC: 12.9 G/DL (ref 11.1–15.9)
IMM GRANULOCYTES # BLD: 0 X10E3/UL (ref 0–0.1)
IMM GRANULOCYTES NFR BLD: 0 %
LDLC SERPL CALC-MCNC: 169 MG/DL (ref 0–99)
LYMPHOCYTES # BLD AUTO: 2.1 X10E3/UL (ref 0.7–3.1)
LYMPHOCYTES NFR BLD AUTO: 42 %
MCH RBC QN AUTO: 30.5 PG (ref 26.6–33)
MCHC RBC AUTO-ENTMCNC: 33.7 G/DL (ref 31.5–35.7)
MCV RBC AUTO: 91 FL (ref 79–97)
MICRODELETION SYND BLD/T FISH: NORMAL
MONOCYTES # BLD AUTO: 0.4 X10E3/UL (ref 0.1–0.9)
MONOCYTES NFR BLD AUTO: 7 %
NEUTROPHILS # BLD AUTO: 2.5 X10E3/UL (ref 1.4–7)
NEUTROPHILS NFR BLD AUTO: 50 %
PLATELET # BLD AUTO: 278 X10E3/UL (ref 150–450)
POTASSIUM SERPL-SCNC: 4.4 MMOL/L (ref 3.5–5.2)
PROT SERPL-MCNC: 7.2 G/DL (ref 6–8.5)
RBC # BLD AUTO: 4.23 X10E6/UL (ref 3.77–5.28)
RPR SER QL: NON REACTIVE
SL AMB EGFR AFRICAN AMERICAN: 85 ML/MIN/1.73
SL AMB EGFR NON AFRICAN AMERICAN: 74 ML/MIN/1.73
SODIUM SERPL-SCNC: 141 MMOL/L (ref 134–144)
TRIGL SERPL-MCNC: 78 MG/DL (ref 0–149)
TSH SERPL DL<=0.005 MIU/L-ACNC: 3.21 UIU/ML (ref 0.45–4.5)
VIT B12 SERPL-MCNC: 809 PG/ML (ref 232–1245)
WBC # BLD AUTO: 5.1 X10E3/UL (ref 3.4–10.8)

## 2020-02-28 ENCOUNTER — HOSPITAL ENCOUNTER (OUTPATIENT)
Dept: RADIOLOGY | Facility: HOSPITAL | Age: 70
Discharge: HOME | End: 2020-02-28
Payer: COMMERCIAL

## 2020-02-28 VITALS — BODY MASS INDEX: 27.09 KG/M2 | WEIGHT: 138 LBS | HEIGHT: 60 IN

## 2020-02-28 DIAGNOSIS — Z12.39 BREAST SCREENING: ICD-10-CM

## 2020-02-28 PROCEDURE — 77063 BREAST TOMOSYNTHESIS BI: CPT

## 2020-02-28 PROCEDURE — 77067 SCR MAMMO BI INCL CAD: CPT

## 2020-04-02 ENCOUNTER — TELEPHONE (OUTPATIENT)
Dept: NEUROLOGY | Facility: CLINIC | Age: 70
End: 2020-04-02

## 2020-04-08 ENCOUNTER — TELEMEDICINE (OUTPATIENT)
Dept: NEUROLOGY | Facility: CLINIC | Age: 70
End: 2020-04-08
Payer: COMMERCIAL

## 2020-04-08 VITALS — WEIGHT: 140 LBS | BODY MASS INDEX: 27.34 KG/M2

## 2020-04-08 DIAGNOSIS — R41.3 POOR SHORT TERM MEMORY: Primary | ICD-10-CM

## 2020-04-08 DIAGNOSIS — R41.3 MEMORY DIFFICULTY: ICD-10-CM

## 2020-04-08 PROCEDURE — 99204 OFFICE O/P NEW MOD 45 MIN: CPT | Performed by: PSYCHIATRY & NEUROLOGY

## 2020-04-22 ENCOUNTER — TELEPHONE (OUTPATIENT)
Dept: MRI IMAGING | Facility: HOSPITAL | Age: 70
End: 2020-04-22

## 2020-04-24 ENCOUNTER — TELEPHONE (OUTPATIENT)
Dept: FAMILY MEDICINE CLINIC | Facility: CLINIC | Age: 70
End: 2020-04-24

## 2020-05-01 ENCOUNTER — TELEMEDICINE (OUTPATIENT)
Dept: FAMILY MEDICINE CLINIC | Facility: CLINIC | Age: 70
End: 2020-05-01
Payer: COMMERCIAL

## 2020-05-01 DIAGNOSIS — Z91.89 FRAMINGHAM CARDIAC RISK <10% IN NEXT 10 YEARS: ICD-10-CM

## 2020-05-01 DIAGNOSIS — M81.0 OSTEOPOROSIS, UNSPECIFIED OSTEOPOROSIS TYPE, UNSPECIFIED PATHOLOGICAL FRACTURE PRESENCE: ICD-10-CM

## 2020-05-01 DIAGNOSIS — Z78.0 MENOPAUSE: ICD-10-CM

## 2020-05-01 DIAGNOSIS — Z00.00 MEDICARE ANNUAL WELLNESS VISIT, SUBSEQUENT: Primary | ICD-10-CM

## 2020-05-01 PROBLEM — J01.00 ACUTE NON-RECURRENT MAXILLARY SINUSITIS: Status: RESOLVED | Noted: 2019-01-02 | Resolved: 2020-05-01

## 2020-05-01 PROCEDURE — 1125F AMNT PAIN NOTED PAIN PRSNT: CPT | Performed by: FAMILY MEDICINE

## 2020-05-01 PROCEDURE — 99214 OFFICE O/P EST MOD 30 MIN: CPT | Performed by: FAMILY MEDICINE

## 2020-05-01 PROCEDURE — 1036F TOBACCO NON-USER: CPT | Performed by: FAMILY MEDICINE

## 2020-05-01 PROCEDURE — 1170F FXNL STATUS ASSESSED: CPT | Performed by: FAMILY MEDICINE

## 2020-05-01 PROCEDURE — G0439 PPPS, SUBSEQ VISIT: HCPCS | Performed by: FAMILY MEDICINE

## 2020-05-05 ENCOUNTER — TELEPHONE (OUTPATIENT)
Dept: ADMINISTRATIVE | Facility: OTHER | Age: 70
End: 2020-05-05

## 2020-06-05 ENCOUNTER — HOSPITAL ENCOUNTER (OUTPATIENT)
Dept: RADIOLOGY | Facility: HOSPITAL | Age: 70
Discharge: HOME | End: 2020-06-05
Attending: PSYCHIATRY & NEUROLOGY

## 2020-06-05 DIAGNOSIS — R41.3 MEMORY DIFFICULTY: ICD-10-CM

## 2020-06-05 DIAGNOSIS — R41.3 POOR SHORT TERM MEMORY: ICD-10-CM

## 2020-06-11 ENCOUNTER — HOSPITAL ENCOUNTER (OUTPATIENT)
Dept: RADIOLOGY | Facility: HOSPITAL | Age: 70
Discharge: HOME | End: 2020-06-11
Attending: FAMILY MEDICINE
Payer: COMMERCIAL

## 2020-06-11 DIAGNOSIS — Z78.0 MENOPAUSE: ICD-10-CM

## 2020-06-11 PROCEDURE — 77080 DXA BONE DENSITY AXIAL: CPT

## 2020-07-10 ENCOUNTER — TELEPHONE (OUTPATIENT)
Dept: NEUROLOGY | Facility: CLINIC | Age: 70
End: 2020-07-10

## 2020-07-10 NOTE — TELEPHONE ENCOUNTER
LVM for patient to call to schedule with Dr Carlos Eduardo Sierra    7/14/20  LVM for patient to call regarding scheduling for Aug 13th due to cancellation   Did inform I will be calling other patients to try to fill spot

## 2020-07-14 ENCOUNTER — TELEPHONE (OUTPATIENT)
Dept: NEUROLOGY | Facility: CLINIC | Age: 70
End: 2020-07-14

## 2020-07-14 NOTE — TELEPHONE ENCOUNTER
Pt returned call wanted to have referral cancelled for neuropscyh eval, stated she went for MRI and had to leave because she could not handle the tube  I gave her the name of a facility close to her for her to give a call to see if she can have it done and informed her I cannot schedule her for eval until 2021 which patient was fine with    I will call patient to see if she had luck scheduling MRI and date and time

## 2020-08-04 ENCOUNTER — TELEPHONE (OUTPATIENT)
Dept: NEUROLOGY | Facility: CLINIC | Age: 70
End: 2020-08-04

## 2020-10-20 ENCOUNTER — TELEPHONE (OUTPATIENT)
Dept: NEUROLOGY | Facility: CLINIC | Age: 70
End: 2020-10-20

## 2021-04-17 NOTE — TELEPHONE ENCOUNTER
Ordered and mailed to home    Pt aware    Feng Muñiz MA
Patient called requesting an order for her Routine mammo       Please mail to patient when this is ordered
abnormal lab result

## 2021-05-18 ENCOUNTER — TELEPHONE (OUTPATIENT)
Dept: FAMILY MEDICINE CLINIC | Facility: CLINIC | Age: 71
End: 2021-05-18

## 2021-05-28 ENCOUNTER — TELEPHONE (OUTPATIENT)
Dept: GASTROENTEROLOGY | Facility: CLINIC | Age: 71
End: 2021-05-28

## 2021-05-28 NOTE — TELEPHONE ENCOUNTER
Spoke to patient, her last EGD was 11/2017 Hx of Reflux  There is no recall or mention in notes of a repeat EGD  She experiences on and off sharp pain in center of chest   Is requesting to be schedule for EGD  Can I schedule EGD?

## 2021-06-01 NOTE — TELEPHONE ENCOUNTER
LMOM for Pt to return call to schedule an OV with Dr Shaheen Song  Advised her he is scheduleing in [address]  Around mid July  If she calls please schedule

## 2021-07-07 ENCOUNTER — TELEPHONE (OUTPATIENT)
Dept: FAMILY MEDICINE CLINIC | Facility: CLINIC | Age: 71
End: 2021-07-07

## 2021-07-07 NOTE — TELEPHONE ENCOUNTER
Spoke with pt to schedule an appointment for AWV  Pt said she would not like to schedule it right now, but will soon    Kaleb Wolfe LPN

## 2021-07-15 ENCOUNTER — OFFICE VISIT (OUTPATIENT)
Dept: GASTROENTEROLOGY | Facility: CLINIC | Age: 71
End: 2021-07-15
Payer: COMMERCIAL

## 2021-07-15 VITALS
SYSTOLIC BLOOD PRESSURE: 127 MMHG | HEART RATE: 71 BPM | WEIGHT: 144 LBS | DIASTOLIC BLOOD PRESSURE: 88 MMHG | BODY MASS INDEX: 28.12 KG/M2

## 2021-07-15 DIAGNOSIS — K63.5 POLYP OF COLON, UNSPECIFIED PART OF COLON, UNSPECIFIED TYPE: ICD-10-CM

## 2021-07-15 DIAGNOSIS — K21.00 GASTROESOPHAGEAL REFLUX DISEASE WITH ESOPHAGITIS, UNSPECIFIED WHETHER HEMORRHAGE: ICD-10-CM

## 2021-07-15 DIAGNOSIS — R13.14 PHARYNGOESOPHAGEAL DYSPHAGIA: Primary | ICD-10-CM

## 2021-07-15 DIAGNOSIS — Z80.0 FAMILY HISTORY OF COLON CANCER: ICD-10-CM

## 2021-07-15 PROCEDURE — 99203 OFFICE O/P NEW LOW 30 MIN: CPT | Performed by: INTERNAL MEDICINE

## 2021-07-15 RX ORDER — PANTOPRAZOLE SODIUM 40 MG/1
40 TABLET, DELAYED RELEASE ORAL DAILY
Qty: 30 TABLET | Refills: 5 | Status: SHIPPED | OUTPATIENT
Start: 2021-07-15

## 2021-07-15 NOTE — PROGRESS NOTES
[facility] - Outpatient Consultation  Kashif Kitchen 72 y o  female MRN: [id number]  Encounter: [id number]          ASSESSMENT AND PLAN:      1  Gastroesophageal reflux disease with esophagitis, unspecified whether hemorrhage    Patient has been experiencing heartburn and indigestion  She is having epigastric discomfort  She has fluid reflux  These happening mostly at nighttime  This is ongoing for several months  She also having some difficulty in swallowing with food his dating going down  She denies any nausea or vomiting  She has some bloating  There is family history of peptic ulcer disease  Ultrasound abdomen also will be considered  - EGD; Future  - pantoprazole (PROTONIX) 40 mg tablet; Take 1 tablet (40 mg total) by mouth daily  Dispense: 30 tablet; Refill: 5    2  Pharyngoesophageal dysphagia    Patient has occasional hesitation of food going down  Food is never gotten stuck  She probably has significant reflux esophagitis and may be hiatal hernia  A upper endoscopy will be done the near future for evaluation     - EGD; Future  - pantoprazole (PROTONIX) 40 mg tablet; Take 1 tablet (40 mg total) by mouth daily  Dispense: 30 tablet; Refill: 5    3  Family history of colon cancer    Family history of colon cancer  Patient has personal history of polyp  Last colonoscopy two years ago  Next colonoscopy likely in three years  4  Polyp of colon, unspecified part of colon, unspecified type    See above description  Patient does not have any colonic symptoms  ______________________________________________________________________    HPI:    Denies any change in bowel habits  Has epigastric pain and discomfort  Heartburn indigestion  There is no nausea or vomiting  There is some bloating present  REVIEW OF SYSTEMS:    CONSTITUTIONAL: Denies any fever, chills, rigors, and weight loss  HEENT: No earache or tinnitus   Denies hearing loss or visual disturbances  CARDIOVASCULAR: No chest pain or palpitations  RESPIRATORY: Denies any cough, hemoptysis, shortness of breath or dyspnea on exertion  GASTROINTESTINAL: As noted in the History of Present Illness  GENITOURINARY: No problems with urination  Denies any hematuria or dysuria  NEUROLOGIC: No dizziness or vertigo, denies headaches  MUSCULOSKELETAL: Denies any muscle or joint pain  SKIN: Denies skin rashes or itching  ENDOCRINE: Denies excessive thirst  Denies intolerance to heat or cold  PSYCHOSOCIAL: Denies depression or anxiety  Denies any recent memory loss         Historical Information   Past Medical History:   Diagnosis Date    Concussion     Last Assessed: 12/26/2013    Memory loss     Nonallopathic lesion of upper extremities 07/03/2008    Other bursitis of hip, unspecified hip 04/09/2008     Past Surgical History:   Procedure Laterality Date    APPENDECTOMY      DILATION AND CURETTAGE OF UTERUS      INCISION TENDON SHEATH HAND      Finger    TONSILLECTOMY       Social History   Social History     Substance and Sexual Activity   Alcohol Use Yes    Comment: rare     Social History     Substance and Sexual Activity   Drug Use No     Social History     Tobacco Use   Smoking Status Never Smoker   Smokeless Tobacco Never Used     Family History   Problem Relation Age of Onset    Fibromyalgia Mother     Arthritis Mother     Hypertension Mother     Cancer Maternal Aunt     Breast cancer Maternal Aunt     Cancer Maternal Uncle     Prostate cancer Maternal Uncle     Heart disease Maternal Grandmother     Colon cancer Maternal Grandfather     Heart disease Maternal Uncle     Diverticulitis Half-Brother        Meds/Allergies       Current Outpatient Medications:     Biotin 51893 MCG TABS    Calcium Citrate-Vitamin D (CITRACAL MAXIMUM) 315-250 MG-UNIT TABS    Cholecalciferol (VITAMIN D3) 125 MCG (5000 UT) TABS    Multiple Vitamin-Folic Acid TABS    pantoprazole (PROTONIX) 40 mg tablet    No Known Allergies        Objective     Blood pressure 127/88, pulse 71, weight 65 3 kg (144 lb)  Body mass index is 28 12 kg/m²  PHYSICAL EXAM:      General Appearance:   Alert, cooperative, no distress   HEENT:   Normocephalic, atraumatic, anicteric      Neck:  Supple, symmetrical, trachea midline   Lungs:   Clear to auscultation bilaterally; no rales, rhonchi or wheezing; respirations unlabored    Heart[de-identified]   Regular rate and rhythm; no murmur, rub, or gallop  Abdomen:   Soft, non-tender, non-distended; normal bowel sounds; no masses, no organomegaly    Genitalia:   Deferred    Rectal:   Deferred    Extremities:  No cyanosis, clubbing or edema    Pulses:  2+ and symmetric    Skin:  No jaundice, rashes, or lesions    Lymph nodes:  No palpable cervical lymphadenopathy        Lab Results:   No visits with results within 1 Day(s) from this visit     Latest known visit with results is:   Orders Only on 01/24/2020   Component Date Value    White Blood Cell Count 01/24/2020 5 1     Red Blood Cell Count 01/24/2020 4 23     Hemoglobin 01/24/2020 12 9     HCT 01/24/2020 38 3     MCV 01/24/2020 91     MCH 01/24/2020 30 5     MCHC 01/24/2020 33 7     RDW 01/24/2020 13 7     Platelet Count 72/32/9851 278     Neutrophils 01/24/2020 50     Lymphocytes 01/24/2020 42     Monocytes 01/24/2020 7     Eosinophils 01/24/2020 1     Basophils PCT 01/24/2020 0     Neutrophils (Absolute) 01/24/2020 2 5     Lymphocytes (Absolute) 01/24/2020 2 1     Monocytes (Absolute) 01/24/2020 0 4     Eosinophils (Absolute) 01/24/2020 0 0     Basophils ABS 01/24/2020 0 0     Immature Granulocytes 01/24/2020 0     Immature Granulocytes (A* 01/24/2020 0 0     Glucose, Random 01/24/2020 92     BUN 01/24/2020 19     Creatinine 01/24/2020 0 82     eGFR Non African American 01/24/2020 74     eGFR  01/24/2020 85     SL AMB BUN/CREATININE RA* 01/24/2020 23     Sodium 01/24/2020 141     Potassium 01/24/2020 4 4     Chloride 01/24/2020 100     CO2 01/24/2020 22     CALCIUM 01/24/2020 9 6     Protein, Total 01/24/2020 7 2     Albumin 01/24/2020 4 6     Globulin, Total 01/24/2020 2 6     Albumin/Globulin Ratio 01/24/2020 1 8     TOTAL BILIRUBIN 01/24/2020 0 3     Alk Phos Isoenzymes 01/24/2020 69     AST 01/24/2020 20     ALT 01/24/2020 20     Cholesterol, Total 01/24/2020 252*    Triglycerides 01/24/2020 78     HDL 01/24/2020 67     LDL Calculated 01/24/2020 169*    Lyme IgG/IgM Ab 01/24/2020 <0 91     Lyme Disease Ab, Quant, * 01/24/2020 <0 80     TSH 01/24/2020 3 210     RPR 01/24/2020 Non Reactive     25-HYDROXY VIT D 01/24/2020 77 5     Antinuclear Antibodies, * 01/24/2020 Negative     Sedimentation Rate 01/24/2020 17     Vitamin B-12 01/24/2020 809     C-Reactive Protein, Quant 01/24/2020 <1     Interpretation 01/24/2020 Note          Radiology Results:   No results found

## 2021-07-19 ENCOUNTER — TELEPHONE (OUTPATIENT)
Dept: GASTROENTEROLOGY | Facility: AMBULATORY SURGERY CENTER | Age: 71
End: 2021-07-19

## 2021-07-19 NOTE — TELEPHONE ENCOUNTER
Arline reschedule pt to another facility  We are out of network  I did not call pt  Thanks [place]

## 2021-07-19 NOTE — TELEPHONE ENCOUNTER
LMOM for pt that [facility] is non par with insurance  We need to reschedule @ [facility]  I will try calling again in a few days  If she calls back please get her rescheduled  Thank you

## 2021-07-19 NOTE — TELEPHONE ENCOUNTER
Patient called back after Rupa left message stating she called her insurance and even the last time she had procedure they sent her a form that everything was covered at [facility] with this insurance  She is going to call insurance when she gets home from work  States she always comes to [facility] with this insurance  Thank you!

## 2021-07-20 NOTE — TELEPHONE ENCOUNTER
Pt will be calling her ins again  She will call me back tomorrow morning with her decision where she wants to be scheduled

## 2021-10-14 ENCOUNTER — TELEPHONE (OUTPATIENT)
Dept: FAMILY MEDICINE CLINIC | Facility: CLINIC | Age: 71
End: 2021-10-14

## 2021-11-02 ENCOUNTER — OFFICE VISIT (OUTPATIENT)
Dept: FAMILY MEDICINE CLINIC | Facility: CLINIC | Age: 71
End: 2021-11-02
Payer: COMMERCIAL

## 2021-11-02 VITALS
SYSTOLIC BLOOD PRESSURE: 104 MMHG | WEIGHT: 150 LBS | TEMPERATURE: 97.9 F | RESPIRATION RATE: 14 BRPM | OXYGEN SATURATION: 97 % | DIASTOLIC BLOOD PRESSURE: 64 MMHG | HEIGHT: 60 IN | BODY MASS INDEX: 29.45 KG/M2 | HEART RATE: 91 BPM

## 2021-11-02 DIAGNOSIS — Z13.6 SCREENING FOR CARDIOVASCULAR, RESPIRATORY, AND GENITOURINARY DISEASES: ICD-10-CM

## 2021-11-02 DIAGNOSIS — K21.9 GASTROESOPHAGEAL REFLUX DISEASE, UNSPECIFIED WHETHER ESOPHAGITIS PRESENT: ICD-10-CM

## 2021-11-02 DIAGNOSIS — Z13.83 SCREENING FOR CARDIOVASCULAR, RESPIRATORY, AND GENITOURINARY DISEASES: ICD-10-CM

## 2021-11-02 DIAGNOSIS — Z13.1 SCREENING FOR DIABETES MELLITUS (DM): ICD-10-CM

## 2021-11-02 DIAGNOSIS — Z00.00 MEDICARE ANNUAL WELLNESS VISIT, SUBSEQUENT: Primary | ICD-10-CM

## 2021-11-02 DIAGNOSIS — Z13.89 SCREENING FOR CARDIOVASCULAR, RESPIRATORY, AND GENITOURINARY DISEASES: ICD-10-CM

## 2021-11-02 PROCEDURE — G0439 PPPS, SUBSEQ VISIT: HCPCS | Performed by: FAMILY MEDICINE

## 2021-11-13 LAB
ALBUMIN SERPL-MCNC: 4.4 G/DL (ref 3.8–4.8)
ALBUMIN/GLOB SERPL: 1.5 {RATIO} (ref 1.2–2.2)
ALP SERPL-CCNC: 72 IU/L (ref 44–121)
ALT SERPL-CCNC: 22 IU/L (ref 0–32)
AST SERPL-CCNC: 27 IU/L (ref 0–40)
BILIRUB SERPL-MCNC: 0.4 MG/DL (ref 0–1.2)
BUN SERPL-MCNC: 17 MG/DL (ref 8–27)
BUN/CREAT SERPL: 21 (ref 12–28)
CALCIUM SERPL-MCNC: 9.9 MG/DL (ref 8.7–10.3)
CHLORIDE SERPL-SCNC: 102 MMOL/L (ref 96–106)
CHOLEST SERPL-MCNC: 251 MG/DL (ref 100–199)
CO2 SERPL-SCNC: 24 MMOL/L (ref 20–29)
CREAT SERPL-MCNC: 0.81 MG/DL (ref 0.57–1)
GLOBULIN SER-MCNC: 2.9 G/DL (ref 1.5–4.5)
GLUCOSE SERPL-MCNC: 89 MG/DL (ref 65–99)
HDLC SERPL-MCNC: 59 MG/DL
LDLC SERPL CALC-MCNC: 178 MG/DL (ref 0–99)
LDLC/HDLC SERPL: 3 RATIO (ref 0–3.2)
MICRODELETION SYND BLD/T FISH: NORMAL
POTASSIUM SERPL-SCNC: 4.5 MMOL/L (ref 3.5–5.2)
PROT SERPL-MCNC: 7.3 G/DL (ref 6–8.5)
SL AMB EGFR AFRICAN AMERICAN: 85 ML/MIN/1.73
SL AMB EGFR NON AFRICAN AMERICAN: 74 ML/MIN/1.73
SL AMB VLDL CHOLESTEROL CALC: 14 MG/DL (ref 5–40)
SODIUM SERPL-SCNC: 140 MMOL/L (ref 134–144)
TRIGL SERPL-MCNC: 81 MG/DL (ref 0–149)

## 2021-12-22 ENCOUNTER — OFFICE VISIT (OUTPATIENT)
Dept: FAMILY MEDICINE CLINIC | Facility: CLINIC | Age: 71
End: 2021-12-22
Payer: COMMERCIAL

## 2021-12-22 VITALS
DIASTOLIC BLOOD PRESSURE: 82 MMHG | SYSTOLIC BLOOD PRESSURE: 142 MMHG | TEMPERATURE: 99.2 F | OXYGEN SATURATION: 100 % | HEART RATE: 91 BPM | HEIGHT: 60 IN | WEIGHT: 147 LBS | RESPIRATION RATE: 16 BRPM | BODY MASS INDEX: 28.86 KG/M2

## 2021-12-22 DIAGNOSIS — B34.9 VIRAL INFECTION, UNSPECIFIED: Primary | ICD-10-CM

## 2021-12-22 PROCEDURE — 99213 OFFICE O/P EST LOW 20 MIN: CPT | Performed by: FAMILY MEDICINE

## 2021-12-22 PROCEDURE — U0003 INFECTIOUS AGENT DETECTION BY NUCLEIC ACID (DNA OR RNA); SEVERE ACUTE RESPIRATORY SYNDROME CORONAVIRUS 2 (SARS-COV-2) (CORONAVIRUS DISEASE [COVID-19]), AMPLIFIED PROBE TECHNIQUE, MAKING USE OF HIGH THROUGHPUT TECHNOLOGIES AS DESCRIBED BY CMS-2020-01-R: HCPCS | Performed by: FAMILY MEDICINE

## 2021-12-22 PROCEDURE — U0005 INFEC AGEN DETEC AMPLI PROBE: HCPCS | Performed by: FAMILY MEDICINE

## 2021-12-23 ENCOUNTER — TELEPHONE (OUTPATIENT)
Dept: FAMILY MEDICINE CLINIC | Facility: CLINIC | Age: 71
End: 2021-12-23

## 2021-12-23 LAB — SARS-COV-2 RNA RESP QL NAA+PROBE: POSITIVE

## 2021-12-24 ENCOUNTER — TELEPHONE (OUTPATIENT)
Dept: FAMILY MEDICINE CLINIC | Facility: CLINIC | Age: 71
End: 2021-12-24

## 2022-01-21 ENCOUNTER — TELEPHONE (OUTPATIENT)
Dept: GASTROENTEROLOGY | Facility: CLINIC | Age: 72
End: 2022-01-21

## 2022-01-21 NOTE — TELEPHONE ENCOUNTER
Procedure: EGD  Scheduled date of procedure (as of today):03/30/22  Physician performing procedure:Dr Daniel Klein  Location of procedure:[facility]  Instructions reviewed with patient by: ti  Clearances: n/a

## 2022-01-21 NOTE — TELEPHONE ENCOUNTER
Patient was seen in July of 2021 by Dr Abdulaziz Rasheed for GERD and pharyngoesophageal dysphagia  He is ordering an EGD but she has [facility]  She was told by her insurance she could have at [facility]  Can you please either let the patient know or me as to where she can have the EGD  Thank you!

## 2022-01-21 NOTE — TELEPHONE ENCOUNTER
Pt rayna asking for a call back regarding her procedure  I returned her call, rayna apologizing for missing her and asked her to please call back

## 2022-03-30 ENCOUNTER — HOSPITAL ENCOUNTER (OUTPATIENT)
Dept: GASTROENTEROLOGY | Facility: AMBULARY SURGERY CENTER | Age: 72
Setting detail: OUTPATIENT SURGERY
Discharge: HOME | End: 2022-03-30
Attending: INTERNAL MEDICINE | Admitting: INTERNAL MEDICINE
Payer: COMMERCIAL

## 2022-03-30 ENCOUNTER — ANESTHESIA (OUTPATIENT)
Dept: GASTROENTEROLOGY | Facility: AMBULARY SURGERY CENTER | Age: 72
End: 2022-03-30

## 2022-03-30 ENCOUNTER — ANESTHESIA EVENT (OUTPATIENT)
Dept: GASTROENTEROLOGY | Facility: AMBULARY SURGERY CENTER | Age: 72
End: 2022-03-30

## 2022-03-30 VITALS
HEART RATE: 53 BPM | DIASTOLIC BLOOD PRESSURE: 70 MMHG | RESPIRATION RATE: 16 BRPM | OXYGEN SATURATION: 96 % | SYSTOLIC BLOOD PRESSURE: 144 MMHG | TEMPERATURE: 97.6 F

## 2022-03-30 DIAGNOSIS — R13.14 PHARYNGOESOPHAGEAL DYSPHAGIA: ICD-10-CM

## 2022-03-30 DIAGNOSIS — K21.00 GASTROESOPHAGEAL REFLUX DISEASE WITH ESOPHAGITIS, UNSPECIFIED WHETHER HEMORRHAGE: ICD-10-CM

## 2022-03-30 PROCEDURE — 88305 TISSUE EXAM BY PATHOLOGIST: CPT | Performed by: PATHOLOGY

## 2022-03-30 PROCEDURE — 43239 EGD BIOPSY SINGLE/MULTIPLE: CPT | Performed by: INTERNAL MEDICINE

## 2022-03-30 RX ORDER — SODIUM CHLORIDE, SODIUM LACTATE, POTASSIUM CHLORIDE, CALCIUM CHLORIDE 600; 310; 30; 20 MG/100ML; MG/100ML; MG/100ML; MG/100ML
75 INJECTION, SOLUTION INTRAVENOUS CONTINUOUS
Status: DISCONTINUED | OUTPATIENT
Start: 2022-03-30 | End: 2022-04-03 | Stop reason: HOSPADM

## 2022-03-30 RX ORDER — SODIUM CHLORIDE, SODIUM LACTATE, POTASSIUM CHLORIDE, CALCIUM CHLORIDE 600; 310; 30; 20 MG/100ML; MG/100ML; MG/100ML; MG/100ML
INJECTION, SOLUTION INTRAVENOUS CONTINUOUS PRN
Status: DISCONTINUED | OUTPATIENT
Start: 2022-03-30 | End: 2022-03-30

## 2022-03-30 RX ORDER — PROPOFOL 10 MG/ML
INJECTION, EMULSION INTRAVENOUS AS NEEDED
Status: DISCONTINUED | OUTPATIENT
Start: 2022-03-30 | End: 2022-03-30

## 2022-03-30 RX ORDER — LIDOCAINE HYDROCHLORIDE 10 MG/ML
INJECTION, SOLUTION EPIDURAL; INFILTRATION; INTRACAUDAL; PERINEURAL AS NEEDED
Status: DISCONTINUED | OUTPATIENT
Start: 2022-03-30 | End: 2022-03-30

## 2022-03-30 RX ADMIN — PROPOFOL 100 MG: 10 INJECTION, EMULSION INTRAVENOUS at 12:22

## 2022-03-30 RX ADMIN — LIDOCAINE HYDROCHLORIDE 50 MG: 10 INJECTION, SOLUTION EPIDURAL; INFILTRATION; INTRACAUDAL; PERINEURAL at 12:22

## 2022-03-30 RX ADMIN — PROPOFOL 50 MG: 10 INJECTION, EMULSION INTRAVENOUS at 12:25

## 2022-03-30 RX ADMIN — SODIUM CHLORIDE, SODIUM LACTATE, POTASSIUM CHLORIDE, AND CALCIUM CHLORIDE: .6; .31; .03; .02 INJECTION, SOLUTION INTRAVENOUS at 12:18

## 2022-03-30 RX ADMIN — SODIUM CHLORIDE, SODIUM LACTATE, POTASSIUM CHLORIDE, AND CALCIUM CHLORIDE 75 ML/HR: .6; .31; .03; .02 INJECTION, SOLUTION INTRAVENOUS at 11:21

## 2022-03-30 NOTE — ANESTHESIA POSTPROCEDURE EVALUATION
Post-Op Assessment Note    CV Status:  Stable  Pain Score: 0    Pain management: adequate     Mental Status:  Alert and awake   Hydration Status:  Stable   PONV Controlled:  None   Airway Patency:  Patent      Post Op Vitals Reviewed: Yes      Staff: CRNA         No complications documented      BP   106/65   Temp      Pulse  62   Resp   16   SpO2   96

## 2022-03-30 NOTE — DISCHARGE SUMMARY
Discharge Summary - Talita Klein 80 y o  female MRN: [id number]    Unit/Bed#:  Encounter: [id number]    Admission Date:  03/30/2022    Admitting Diagnosis: Gastroesophageal reflux disease with esophagitis, unspecified whether hemorrhage [K21 00]  Pharyngoesophageal dysphagia [R13 14]    HPI:  Reflux symptoms  Procedures Performed: No orders of the defined types were placed in this encounter  Summary of Hospital Course: Tolerated procedure well  Significant Findings, Care, Treatment and Services Provided:  Tiny hiatal hernia noted  Mild gastritis noted  Complications:  None    Discharge Diagnosis:  See above    Medical Problems             Resolved Problems  Date Reviewed: 12/22/2021    None                Condition at Discharge: good         Discharge instructions/Information to patient and family:   See after visit summary for information provided to patient and family  Provisions for Follow-Up Care:  See after visit summary for information related to follow-up care and any pertinent home health orders        PCP: Nancy Ibrahim DO    Disposition: Home
78

## 2022-03-30 NOTE — INTERVAL H&P NOTE
H&P reviewed  After examining the patient I find no changes in the patients condition since the H&P had been written      Vitals:    03/30/22 1106   BP: 148/95   Pulse: 65   Resp: 18   Temp: 97 6 °F (36 4 °C)   SpO2: 98%

## 2022-03-30 NOTE — H&P
History and Physical -  [facility]  Carmen Woody 80 y o  female MRN: [id number]                  HPI: Carmen Woody is a 80y o  year old female who presents for longstanding history of gastroesophageal reflux disease  Recently she is doing well  She has gone off proton pump inhibitor  Her last endoscopy was four or five years ago  REVIEW OF SYSTEMS: Per the HPI, and otherwise unremarkable      Historical Information   Past Medical History:   Diagnosis Date    Arthritis     Colon polyp     Concussion     Last Assessed: 2013    GERD (gastroesophageal reflux disease)     Nonallopathic lesion of upper extremities 2008    Other bursitis of hip, unspecified hip 2008    Wears glasses      Past Surgical History:   Procedure Laterality Date    APPENDECTOMY       SECTION      COLONOSCOPY      DILATION AND CURETTAGE OF UTERUS      EGD      INCISION TENDON SHEATH HAND      Finger    TONSILLECTOMY      TUBAL LIGATION       Social History   Social History     Substance and Sexual Activity   Alcohol Use Yes    Comment: rare     Social History     Substance and Sexual Activity   Drug Use No     Social History     Tobacco Use   Smoking Status Never Smoker   Smokeless Tobacco Never Used     Family History   Problem Relation Age of Onset    Fibromyalgia Mother     Arthritis Mother     Hypertension Mother     Cancer Mother         pancreas w/mets to the liver, lung    Cancer Maternal Aunt     Breast cancer Maternal Aunt     Cancer Maternal Uncle     Prostate cancer Maternal Uncle     Heart disease Maternal Grandmother     Colon cancer Maternal Grandfather     Heart disease Maternal Uncle     Diverticulitis Half-Brother        Meds/Allergies       Current Outpatient Medications:     Biotin 46510 MCG TABS    Calcium Citrate-Vitamin D (CITRACAL MAXIMUM) 315-250 MG-UNIT TABS    Magnesium 250 MG TABS    Multiple Vitamin-Folic Acid TABS    pantoprazole (PROTONIX) 40 mg tablet    Current Facility-Administered Medications:     lactated ringers infusion, 75 mL/hr, Intravenous, Continuous, 75 mL/hr at 03/30/22 1121    No Known Allergies    Objective     /95   Pulse 65   Temp 97 6 °F (36 4 °C) (Temporal)   Resp 18   SpO2 98%       PHYSICAL EXAM    Gen: NAD  Head: NCAT  CV: RRR  CHEST: Clear  ABD: soft, NT/ND  EXT: no edema      ASSESSMENT/PLAN:  This is a 80y o  year old female here for EGD, and she is stable and optimized for her procedure

## 2022-03-30 NOTE — ANESTHESIA PREPROCEDURE EVALUATION
Procedure:  EGD    Relevant Problems   CARDIO   (+) Mixed hyperlipidemia (no meds)      GI/HEPATIC   (+) GERD (gastroesophageal reflux disease)      MUSCULOSKELETAL   (+) Low back pain   (+) Osteitis pubis (HCC)   (+) Osteoarthritis of pelvis        Physical Exam    Airway    Mallampati score: II  TM Distance: >3 FB  Neck ROM: full     Dental       Cardiovascular  Rhythm: regular, Rate: normal,     Pulmonary  Breath sounds clear to auscultation,     Other Findings        Anesthesia Plan  ASA Score- 2     Anesthesia Type- IV sedation with anesthesia with ASA Monitors  Additional Monitors:   Airway Plan:           Plan Factors-    Chart reviewed  Patient is not a current smoker  Induction- intravenous  Postoperative Plan-     Informed Consent- Anesthetic plan and risks discussed with patient  I personally reviewed this patient with the CRNA  Discussed and agreed on the Anesthesia Plan with the CRNA  Luke Flores

## 2022-04-12 ENCOUNTER — HOSPITAL ENCOUNTER (OUTPATIENT)
Dept: RADIOLOGY | Facility: HOSPITAL | Age: 72
Discharge: HOME | End: 2022-04-12
Payer: COMMERCIAL

## 2022-04-12 VITALS — HEIGHT: 61 IN | WEIGHT: 143 LBS | BODY MASS INDEX: 27 KG/M2

## 2022-04-12 DIAGNOSIS — Z12.31 ENCOUNTER FOR SCREENING MAMMOGRAM FOR MALIGNANT NEOPLASM OF BREAST: ICD-10-CM

## 2022-04-12 DIAGNOSIS — R92.2 DENSE BREASTS: ICD-10-CM

## 2022-04-12 PROCEDURE — 77063 BREAST TOMOSYNTHESIS BI: CPT

## 2022-04-12 PROCEDURE — 77067 SCR MAMMO BI INCL CAD: CPT

## 2022-04-29 ENCOUNTER — TELEPHONE (OUTPATIENT)
Dept: FAMILY MEDICINE CLINIC | Facility: CLINIC | Age: 72
End: 2022-04-29

## 2022-04-29 NOTE — TELEPHONE ENCOUNTER
She left without having the Tdap   She did not want to sign the ABN and Irving Bocanegra discussed with her about having this done at the [address]

## 2022-06-23 ENCOUNTER — HOSPITAL ENCOUNTER (OUTPATIENT)
Dept: RADIOLOGY | Facility: HOSPITAL | Age: 72
Discharge: HOME | End: 2022-06-23
Payer: COMMERCIAL

## 2022-06-23 DIAGNOSIS — Z13.820 SCREENING FOR OSTEOPOROSIS: ICD-10-CM

## 2022-06-23 PROCEDURE — 77080 DXA BONE DENSITY AXIAL: CPT

## 2023-07-07 ENCOUNTER — NURSE TRIAGE (OUTPATIENT)
Age: 73
End: 2023-07-07

## 2023-07-07 NOTE — TELEPHONE ENCOUNTER
----- [place] sent at 7/7/2023  1:22 PM EDT -----  Patients GI provider:  Dr. Isa Nichols    Number to return call: [phone]    Reason for call: Pt calling with complaints of chest pain (Pt did see Cardiology). Pt requesting sooner available appt. Please contact patient to further discuss.     Scheduled procedure/appointment date if applicable: 2/58/8428

## 2023-07-13 ENCOUNTER — NURSE TRIAGE (OUTPATIENT)
Age: 73
End: 2023-07-13

## 2023-07-13 NOTE — TELEPHONE ENCOUNTER
----- Message from [facility] sent at 7/13/2023 11:08 AM EDT -----  Pt calling and would like to see Dr Roshan Austin today because she is having pressure in the front and back.  She also states when she goes to the bathroom she is having mucousy blood

## 2023-07-17 ENCOUNTER — NURSE TRIAGE (OUTPATIENT)
Age: 73
End: 2023-07-17

## 2023-07-17 NOTE — TELEPHONE ENCOUNTER
Pt called back to speak with a nurse states she didn't get any voicemail. Not sure what's going on.     Alternate call back # [phone] daughter Anthony Tenorio

## 2023-07-17 NOTE — TELEPHONE ENCOUNTER
I returned call,patient called back in from last week's messages,  no answer, left message to call back and that she could speak with any GI nurse that may be available.

## 2023-07-17 NOTE — TELEPHONE ENCOUNTER
Last OV 2021 Dr. Isa Nichols  EGD- 3/30/22     Patient calling in, reports she was having pressure in her vaginal and rectum area last week, she went to urgent care and was told she has hemorrhoids and constipation. Patient started eating prunes, drinking lots of water and today started on miralax. Patient would like to schedule OV with Dr. Isa Nichols for sooner than September. She is concerned with her recent change in bowel habits. I did provide patient with care advise, miralax BID for the next couple of days, increase water.

## 2023-07-18 ENCOUNTER — OFFICE VISIT (OUTPATIENT)
Dept: GASTROENTEROLOGY | Facility: CLINIC | Age: 73
End: 2023-07-18
Payer: COMMERCIAL

## 2023-07-18 ENCOUNTER — TELEPHONE (OUTPATIENT)
Dept: GASTROENTEROLOGY | Facility: CLINIC | Age: 73
End: 2023-07-18

## 2023-07-18 VITALS
HEART RATE: 73 BPM | BODY MASS INDEX: 24.92 KG/M2 | SYSTOLIC BLOOD PRESSURE: 107 MMHG | HEIGHT: 61 IN | DIASTOLIC BLOOD PRESSURE: 74 MMHG | WEIGHT: 132 LBS

## 2023-07-18 DIAGNOSIS — K57.30 COLON, DIVERTICULOSIS: ICD-10-CM

## 2023-07-18 DIAGNOSIS — K92.1 BLOOD IN STOOL: Primary | ICD-10-CM

## 2023-07-18 DIAGNOSIS — R19.8 RECTAL PRESSURE: ICD-10-CM

## 2023-07-18 DIAGNOSIS — K64.8 INTERNAL HEMORRHOIDS: ICD-10-CM

## 2023-07-18 PROCEDURE — 99214 OFFICE O/P EST MOD 30 MIN: CPT | Performed by: INTERNAL MEDICINE

## 2023-07-18 PROCEDURE — 46221 LIGATION OF HEMORRHOID(S): CPT | Performed by: INTERNAL MEDICINE

## 2023-07-18 RX ORDER — ASPIRIN 81 MG/1
1 TABLET ORAL DAILY
COMMUNITY
Start: 2023-03-30

## 2023-07-18 RX ORDER — POLYETHYLENE GLYCOL 3350, SODIUM SULFATE ANHYDROUS, SODIUM BICARBONATE, SODIUM CHLORIDE, POTASSIUM CHLORIDE 236; 22.74; 6.74; 5.86; 2.97 G/4L; G/4L; G/4L; G/4L; G/4L
4 POWDER, FOR SOLUTION ORAL ONCE
Qty: 4000 ML | Refills: 0 | Status: SHIPPED | OUTPATIENT
Start: 2023-07-18 | End: 2023-07-18

## 2023-07-18 RX ORDER — METOPROLOL SUCCINATE 25 MG/1
25 TABLET, EXTENDED RELEASE ORAL
COMMUNITY
Start: 2023-05-28

## 2023-07-18 NOTE — TELEPHONE ENCOUNTER
I called [phone], no answer, left a message that previous calls were regarding scheduling an earlier appt than September. Patient is currently scheduled for 7/20/23 with Dr. Miriam Ruth. Call back if questions.

## 2023-07-18 NOTE — TELEPHONE ENCOUNTER
Scheduled date of colonoscopy (as of today):07/25/23  Physician performing colonoscopy:Dr. Chavarria  Location of colonoscopy:[facility]  Bowel prep reviewed with patient:Coreen  Instructions reviewed with patient by:maddi  Clearances: n/a

## 2023-07-18 NOTE — PROGRESS NOTES
[facility] - Outpatient Consultation  Fran Crespo 71 y.o. female MRN: [id number]  Encounter: [id number]          ASSESSMENT AND PLAN:      1. Blood in stool  -     Colonoscopy; Future; Expected date: 07/18/2023  -     polyethylene glycol (Golytely) 4000 mL solution; Take 4,000 mL by mouth once for 1 dose Take according to instructions given by the office for colonoscopy bowel prep. 2. Internal hemorrhoids  -     Anal Excision Procedures    3. Colon, diverticulosis    4. Rectal pressure      Patient has some rectal pressure, sensation of something stuck in the rectum, blood in the stools, denies any excessive constipation or hard stools or painful defecation, most likely has internal hemorrhoids and/or spasm/constipation. Blood of concern. Last colonoscopy July 2019, was generally unremarkable exam for diverticulosis. Advised to take stool softener and fiber supplement to manage the bowels better. Digital exam no mass tenderness or fissure. Anoscopy performed, hemorrhoid at 9 o'clock position, some erythematous mucosa seen, not sure if there is a rectal prolapse. Anal Excision Procedures    Performed by: Alin Jacob MD  Authorized by: Alin Jacob MD    Universal Protocol:     Verbal consent obtained?: Yes      Written consent obtained?: Yes      Risks and benefits: Risks, benefits and alternatives were discussed      Consent given by:  Patient    Patient states understanding of procedure being performed: Yes      Patient identity confirmed:  Verbally with patient  A time out verifies correct patient, procedure, equipment, support staff and site/side marked as required:   Procedure Type: hemorrhoidectomy    Hemorrhoidectomy Details:   Hemorrhoid type: internal    Internal position:  Nine o'clock  Single rubber band ligation      Rubber band was performed at 9 o'clock position/seems satisfactory but patient has some discomfort in pulling sensation clearly in suprapubic area. There for some of the tissue in this this band was released and she left the office in comfortable position. \    We will schedule colonoscopy for above symptoms as well. This plan was discussed with patient's daughter as well    ______________________________________________________________________    HPI:    Patient came in for evaluation of her symptoms of rectal pressure, sometimes feels something there and unable to move her bowels. Some noted some blood in the stools as well. She believes she has internal hemorrhoids. Denies any dysuria hematuria, denies any vaginal or rectal prolapsing tissue, appetite is fair weight stable bowels are regular. Denies chest pain shortness of breath coughing choking spells upper GI symptoms, reasonably stable health otherwise, last colonoscopy 4 years ago. Diet medications more than 10 system reviewed. REVIEW OF SYSTEMS:    CONSTITUTIONAL: Denies any fever, chills, rigors, and weight loss. HEENT: No earache or tinnitus. CARDIOVASCULAR: No chest pain or palpitations. RESPIRATORY: Denies any cough, hemoptysis, shortness of breath or dyspnea on exertion. GASTROINTESTINAL: As noted in the History of Present Illness. GENITOURINARY: Denies any hematuria or dysuria. NEUROLOGIC: No dizziness or vertigo. MUSCULOSKELETAL: Denies any joint swellings. SKIN: Denies skin rashes or itching. ENDOCRINE: Denies excessive thirst. Denies intolerance to heat or cold. PSYCHOSOCIAL: Denies depression or anxiety. Denies any recent memory loss.        Historical Information   Past Medical History:   Diagnosis Date   • Arthritis    • Colon polyp    • Concussion     Last Assessed: 2013   • GERD (gastroesophageal reflux disease)    • Nonallopathic lesion of upper extremities 2008   • Other bursitis of hip, unspecified hip 2008   • Wears glasses      Past Surgical History:   Procedure Laterality Date   • APPENDECTOMY     •  SECTION     • COLONOSCOPY     • DILATION AND CURETTAGE OF UTERUS     • EGD     • INCISION TENDON SHEATH HAND      Finger   • TONSILLECTOMY     • TUBAL LIGATION       Social History   Social History     Substance and Sexual Activity   Alcohol Use Yes    Comment: rare     Social History     Substance and Sexual Activity   Drug Use No     Social History     Tobacco Use   Smoking Status Never   Smokeless Tobacco Never     Family History   Problem Relation Age of Onset   • Fibromyalgia Mother    • Arthritis Mother    • Hypertension Mother    • Pancreatic cancer Mother    • Liver cancer Mother    • Lung cancer Mother    • Cancer Maternal Aunt    • Breast cancer Maternal Aunt 48   • Cancer Maternal Uncle    • Prostate cancer Maternal Uncle    • Heart disease Maternal Grandmother    • Colon cancer Maternal Grandfather    • Heart disease Maternal Uncle    • Diverticulitis Half-Brother        Meds/Allergies       Current Outpatient Medications:   •  aspirin (Miniprin Low Dose) 81 mg EC tablet  •  Calcium Citrate-Vitamin D (CITRACAL MAXIMUM) 315-250 MG-UNIT TABS  •  Magnesium 250 MG TABS  •  Melatonin 3 MG CAPS  •  metoprolol succinate (TOPROL-XL) 25 mg 24 hr tablet  •  Multiple Vitamin-Folic Acid TABS  •  polyethylene glycol (Golytely) 4000 mL solution    No Known Allergies        Objective     Blood pressure 107/74, pulse 73, height 5' 1" (1.549 m), weight 59.9 kg (132 lb). Body mass index is 24.94 kg/m². PHYSICAL EXAM:      General Appearance:   Alert, cooperative, no distress   HEENT:   Normocephalic, atraumatic, anicteric. Neck:  Supple, symmetrical, trachea midline   Lungs:   Clear to auscultation bilaterally; no rales, rhonchi or wheezing; respirations unlabored    Heart[de-identified]   Regular rate and rhythm; no murmur.    Abdomen:   Soft, non-tender, non-distended; normal bowel sounds; no masses, no organomegaly    Genitalia:   Deferred    Rectal:   Deferred    Extremities:  No cyanosis, clubbing or edema    Skin:  No jaundice, rashes, or lesions Lymph nodes:  No palpable cervical lymphadenopathy        Lab Results:   No visits with results within 1 Day(s) from this visit. Latest known visit with results is:   Hospital Outpatient Visit on 03/30/2022   Component Date Value   • Case Report 03/30/2022                      Value:Surgical Pathology Report                         Case: [id number]                                   Authorizing Provider:  Jf Mora MD      Collected:           03/30/2022 1226              Ordering Location:     [facility]   Received:            03/30/2022 [address] [place] [address]                                                                       Pathologist:           Radhames Sahu MD                                                          Specimens:   A) - Duodenum, bxs white lesion in duodenal bulb                                                    B) - Stomach, antral bxs- gastritis                                                                 C) - Esophagus, bxs- GE junction                                                          • Final Diagnosis 03/30/2022                      Value: This result contains rich text formatting which cannot be displayed here. • Additional Information 03/30/2022                      Value: This result contains rich text formatting which cannot be displayed here. • Gross Description 03/30/2022                      Value: This result contains rich text formatting which cannot be displayed here. Radiology Results:   No results found.

## 2023-07-25 ENCOUNTER — ANESTHESIA EVENT (OUTPATIENT)
Dept: GASTROENTEROLOGY | Facility: AMBULARY SURGERY CENTER | Age: 73
End: 2023-07-25

## 2023-07-25 ENCOUNTER — HOSPITAL ENCOUNTER (OUTPATIENT)
Dept: GASTROENTEROLOGY | Facility: AMBULARY SURGERY CENTER | Age: 73
Setting detail: OUTPATIENT SURGERY
Discharge: HOME | End: 2023-07-25
Attending: INTERNAL MEDICINE
Payer: COMMERCIAL

## 2023-07-25 ENCOUNTER — ANESTHESIA (OUTPATIENT)
Dept: GASTROENTEROLOGY | Facility: AMBULARY SURGERY CENTER | Age: 73
End: 2023-07-25

## 2023-07-25 VITALS
WEIGHT: 132 LBS | RESPIRATION RATE: 18 BRPM | BODY MASS INDEX: 24.92 KG/M2 | SYSTOLIC BLOOD PRESSURE: 131 MMHG | DIASTOLIC BLOOD PRESSURE: 69 MMHG | TEMPERATURE: 97.7 F | OXYGEN SATURATION: 98 % | HEIGHT: 61 IN | HEART RATE: 62 BPM

## 2023-07-25 DIAGNOSIS — K92.1 BLOOD IN STOOL: ICD-10-CM

## 2023-07-25 PROBLEM — I35.1 MILD AORTIC VALVE REGURGITATION: Status: ACTIVE | Noted: 2023-05-05

## 2023-07-25 PROCEDURE — 45378 DIAGNOSTIC COLONOSCOPY: CPT | Performed by: INTERNAL MEDICINE

## 2023-07-25 RX ORDER — PROPOFOL 10 MG/ML
INJECTION, EMULSION INTRAVENOUS CONTINUOUS PRN
Status: DISCONTINUED | OUTPATIENT
Start: 2023-07-25 | End: 2023-07-25

## 2023-07-25 RX ORDER — SODIUM CHLORIDE, SODIUM LACTATE, POTASSIUM CHLORIDE, CALCIUM CHLORIDE 600; 310; 30; 20 MG/100ML; MG/100ML; MG/100ML; MG/100ML
INJECTION, SOLUTION INTRAVENOUS CONTINUOUS PRN
Status: DISCONTINUED | OUTPATIENT
Start: 2023-07-25 | End: 2023-07-25

## 2023-07-25 RX ORDER — LIDOCAINE HYDROCHLORIDE 10 MG/ML
INJECTION, SOLUTION EPIDURAL; INFILTRATION; INTRACAUDAL; PERINEURAL AS NEEDED
Status: DISCONTINUED | OUTPATIENT
Start: 2023-07-25 | End: 2023-07-25

## 2023-07-25 RX ORDER — SODIUM CHLORIDE, SODIUM LACTATE, POTASSIUM CHLORIDE, CALCIUM CHLORIDE 600; 310; 30; 20 MG/100ML; MG/100ML; MG/100ML; MG/100ML
125 INJECTION, SOLUTION INTRAVENOUS CONTINUOUS
Status: DISCONTINUED | OUTPATIENT
Start: 2023-07-25 | End: 2023-07-29 | Stop reason: HOSPADM

## 2023-07-25 RX ORDER — PROPOFOL 10 MG/ML
INJECTION, EMULSION INTRAVENOUS AS NEEDED
Status: DISCONTINUED | OUTPATIENT
Start: 2023-07-25 | End: 2023-07-25

## 2023-07-25 RX ADMIN — LIDOCAINE HYDROCHLORIDE 50 MG: 10 INJECTION, SOLUTION EPIDURAL; INFILTRATION; INTRACAUDAL; PERINEURAL at 07:37

## 2023-07-25 RX ADMIN — SODIUM CHLORIDE, SODIUM LACTATE, POTASSIUM CHLORIDE, AND CALCIUM CHLORIDE 125 ML/HR: .6; .31; .03; .02 INJECTION, SOLUTION INTRAVENOUS at 07:28

## 2023-07-25 RX ADMIN — SODIUM CHLORIDE, SODIUM LACTATE, POTASSIUM CHLORIDE, AND CALCIUM CHLORIDE: .6; .31; .03; .02 INJECTION, SOLUTION INTRAVENOUS at 07:29

## 2023-07-25 RX ADMIN — PROPOFOL 100 MG: 10 INJECTION, EMULSION INTRAVENOUS at 07:37

## 2023-07-25 RX ADMIN — PROPOFOL 130 MCG/KG/MIN: 10 INJECTION, EMULSION INTRAVENOUS at 07:37

## 2023-07-25 NOTE — H&P
History and Physical -  [facility]  Krystal Bravo 71 y.o. female MRN: [id number]                  HPI: Krystal Bravo is a 71y.o. year old female who presents for history of polyp      REVIEW OF SYSTEMS: Per the HPI, and otherwise unremarkable.     Historical Information   Past Medical History:   Diagnosis Date   • Arthritis    • Colon polyp    • Concussion     Last Assessed: 2013   • GERD (gastroesophageal reflux disease)    • Nonallopathic lesion of upper extremities 2008   • Other bursitis of hip, unspecified hip 2008   • Wears glasses      Past Surgical History:   Procedure Laterality Date   • APPENDECTOMY     •  SECTION     • COLONOSCOPY     • DILATION AND CURETTAGE OF UTERUS     • EGD     • INCISION TENDON SHEATH HAND      Finger   • TONSILLECTOMY     • TUBAL LIGATION       Social History   Social History     Substance and Sexual Activity   Alcohol Use Yes    Comment: rare     Social History     Substance and Sexual Activity   Drug Use No     Social History     Tobacco Use   Smoking Status Never   • Passive exposure: Never   Smokeless Tobacco Never     Family History   Problem Relation Age of Onset   • Fibromyalgia Mother    • Arthritis Mother    • Hypertension Mother    • Pancreatic cancer Mother    • Liver cancer Mother    • Lung cancer Mother    • Cancer Maternal Aunt    • Breast cancer Maternal Aunt 48   • Cancer Maternal Uncle    • Prostate cancer Maternal Uncle    • Heart disease Maternal Grandmother    • Colon cancer Maternal Grandfather    • Heart disease Maternal Uncle    • Diverticulitis Half-Brother        Meds/Allergies       Current Outpatient Medications:   •  aspirin (Miniprin Low Dose) 81 mg EC tablet  •  Calcium Citrate-Vitamin D (CITRACAL MAXIMUM) 315-250 MG-UNIT TABS  •  Magnesium 250 MG TABS  •  Melatonin 3 MG CAPS  •  Multiple Vitamin-Folic Acid TABS  •  metoprolol succinate (TOPROL-XL) 25 mg 24 hr tablet  •  polyethylene glycol (Golytely) 4000 mL solution    Current Facility-Administered Medications:   •  lactated ringers infusion, 125 mL/hr, Intravenous, Continuous, 125 mL/hr at 07/25/23 8933    No Known Allergies    Objective     /77   Pulse 57   Temp 97.7 °F (36.5 °C) (Temporal)   Resp 18   Ht 5' 1" (1.549 m)   Wt 59.9 kg (132 lb)   SpO2 98%   BMI 24.94 kg/m²       PHYSICAL EXAM    Gen: NAD  Head: NCAT  CV: RRR  CHEST: Clear  ABD: soft, NT/ND  EXT: no edema      ASSESSMENT/PLAN:  This is a 71y.o. year old female here for colonoscopy, and she is stable and optimized for her procedure.

## 2023-07-25 NOTE — ANESTHESIA PREPROCEDURE EVALUATION
Procedure:  COLONOSCOPY    Relevant Problems   CARDIO   (+) Mild aortic valve regurgitation   (+) Mixed hyperlipidemia      GI/HEPATIC   (+) GERD (gastroesophageal reflux disease)   hemorrhoid     Physical Exam    Airway    Mallampati score: II  TM Distance: >3 FB  Neck ROM: full     Dental   Comment: Denies loose teeth,     Cardiovascular  Cardiovascular exam normal    Pulmonary  Pulmonary exam normal     Other Findings  Portions of exam deferred due to low yield and/or unknown COVID status      Anesthesia Plan  ASA Score- 2     Anesthesia Type- IV sedation with anesthesia with ASA Monitors. Additional Monitors:   Airway Plan:           Plan Factors-Exercise tolerance (METS): >4 METS. Chart reviewed. Existing labs reviewed. Patient summary reviewed. Patient is not a current smoker. Induction- intravenous. Postoperative Plan-     Informed Consent- Anesthetic plan and risks discussed with patient. I personally reviewed this patient with the CRNA. Discussed and agreed on the Anesthesia Plan with the CRNA. Jamel Wahl

## 2023-07-25 NOTE — ANESTHESIA POSTPROCEDURE EVALUATION
Post-Op Assessment Note    CV Status:  Stable  Pain Score: 0    Pain management: adequate     Mental Status:  Sleepy   Hydration Status:  Stable   PONV Controlled:  None   Airway Patency:  Patent   Two or more mitigation strategies used for obstructive sleep apnea   Post Op Vitals Reviewed: Yes      Staff: CRNA         No notable events documented.     BP   97/55   Temp 97   Pulse 58   Resp 16   SpO2 99

## 2023-08-14 ENCOUNTER — TELEPHONE (OUTPATIENT)
Dept: FAMILY MEDICINE CLINIC | Facility: CLINIC | Age: 73
End: 2023-08-14

## 2023-09-25 ENCOUNTER — TELEPHONE (OUTPATIENT)
Age: 73
End: 2023-09-25

## 2023-09-25 NOTE — TELEPHONE ENCOUNTER
Patient called stating that she has urine frequency and burning. She is asking if she can come in for a urine dip, or have medication sent to the pharmacy. She did ask for an appointment, however I do not see anything available with her provider at the time she needs. She works every day until 4 PM, and would be able to get to the office by 5 PM. Please advise how she should proceed.

## 2023-09-26 ENCOUNTER — OFFICE VISIT (OUTPATIENT)
Dept: FAMILY MEDICINE CLINIC | Facility: CLINIC | Age: 73
End: 2023-09-26
Payer: COMMERCIAL

## 2023-09-26 VITALS
DIASTOLIC BLOOD PRESSURE: 64 MMHG | RESPIRATION RATE: 18 BRPM | TEMPERATURE: 96.8 F | BODY MASS INDEX: 25.81 KG/M2 | HEART RATE: 62 BPM | WEIGHT: 136.6 LBS | SYSTOLIC BLOOD PRESSURE: 122 MMHG

## 2023-09-26 DIAGNOSIS — Z13.83 SCREENING FOR CARDIOVASCULAR, RESPIRATORY, AND GENITOURINARY DISEASES: ICD-10-CM

## 2023-09-26 DIAGNOSIS — Z00.00 MEDICARE ANNUAL WELLNESS VISIT, SUBSEQUENT: Primary | ICD-10-CM

## 2023-09-26 DIAGNOSIS — Z13.1 SCREENING FOR DIABETES MELLITUS (DM): ICD-10-CM

## 2023-09-26 DIAGNOSIS — R39.9 UTI SYMPTOMS: ICD-10-CM

## 2023-09-26 DIAGNOSIS — N39.0 ACUTE UTI: ICD-10-CM

## 2023-09-26 DIAGNOSIS — Z12.31 BREAST CANCER SCREENING BY MAMMOGRAM: ICD-10-CM

## 2023-09-26 DIAGNOSIS — Z13.89 SCREENING FOR CARDIOVASCULAR, RESPIRATORY, AND GENITOURINARY DISEASES: ICD-10-CM

## 2023-09-26 DIAGNOSIS — E78.2 MIXED HYPERLIPIDEMIA: ICD-10-CM

## 2023-09-26 DIAGNOSIS — Z13.6 SCREENING FOR CARDIOVASCULAR, RESPIRATORY, AND GENITOURINARY DISEASES: ICD-10-CM

## 2023-09-26 PROBLEM — I34.0 MILD MITRAL VALVE REGURGITATION: Status: ACTIVE | Noted: 2023-05-05

## 2023-09-26 PROBLEM — R53.83 FATIGUE: Status: RESOLVED | Noted: 2018-07-12 | Resolved: 2023-09-26

## 2023-09-26 PROBLEM — M86.9 OSTEITIS PUBIS (HCC): Status: RESOLVED | Noted: 2017-12-12 | Resolved: 2023-09-26

## 2023-09-26 PROBLEM — R10.2 PELVIC PAIN IN FEMALE: Status: RESOLVED | Noted: 2017-11-08 | Resolved: 2023-09-26

## 2023-09-26 PROBLEM — H81.10 BENIGN PAROXYSMAL POSITIONAL VERTIGO: Status: RESOLVED | Noted: 2018-07-12 | Resolved: 2023-09-26

## 2023-09-26 LAB
SL AMB  POCT GLUCOSE, UA: NEGATIVE
SL AMB LEUKOCYTE ESTERASE,UA: NORMAL
SL AMB POCT BILIRUBIN,UA: NEGATIVE
SL AMB POCT BLOOD,UA: NEGATIVE
SL AMB POCT CLARITY,UA: CLEAR
SL AMB POCT COLOR,UA: YELLOW
SL AMB POCT KETONES,UA: NEGATIVE
SL AMB POCT NITRITE,UA: NEGATIVE
SL AMB POCT PH,UA: 7
SL AMB POCT SPECIFIC GRAVITY,UA: 1.02
SL AMB POCT URINE PROTEIN: NEGATIVE
SL AMB POCT UROBILINOGEN: 0.2

## 2023-09-26 PROCEDURE — G0439 PPPS, SUBSEQ VISIT: HCPCS | Performed by: FAMILY MEDICINE

## 2023-09-26 PROCEDURE — 99213 OFFICE O/P EST LOW 20 MIN: CPT | Performed by: FAMILY MEDICINE

## 2023-09-26 PROCEDURE — 81003 URINALYSIS AUTO W/O SCOPE: CPT | Performed by: FAMILY MEDICINE

## 2023-09-26 RX ORDER — SULFAMETHOXAZOLE AND TRIMETHOPRIM 800; 160 MG/1; MG/1
1 TABLET ORAL 2 TIMES DAILY
Qty: 6 TABLET | Refills: 0 | Status: SHIPPED | OUTPATIENT
Start: 2023-09-26 | End: 2023-09-29

## 2023-09-26 NOTE — PROGRESS NOTES
Assessment/Plan:    No problem-specific Assessment & Plan notes found for this encounter. suspect UTI  UA pos with symptoms  Bactrim due to age  F/u if no better    HLD  Reassess  Reports low CAC score at cardiologist   Not sure if cardiologist aware she is not on statin for HAY score  Pt advised of risks/benefits of statin use     Diagnoses and all orders for this visit:    Medicare annual wellness visit, subsequent    Breast cancer screening by mammogram  -     Mammo screening bilateral w 3d & cad; Future    UTI symptoms  -     POCT urine dip auto non-scope    Mixed hyperlipidemia    Acute UTI  -     sulfamethoxazole-trimethoprim (BACTRIM DS) 800-160 mg per tablet; Take 1 tablet by mouth 2 (two) times a day for 3 days    Screening for cardiovascular, respiratory, and genitourinary diseases  -     Lipid Panel with Direct LDL reflex; Future    Screening for diabetes mellitus (DM)  -     Comprehensive metabolic panel; Future        No follow-ups on file. Subjective:      Patient ID: Sandor Meza is a 71 y.o. female. Chief Complaint   Patient presents with   • Follow-up     [facility] CMA        HPI  Notes frequency, small amount of urine volume, about 3d  Pressure in am noted  No bloody or cloudy urine or burning  Tez at end of urination pressure  On/off  No fever  No n/v  No back pain    The following portions of the patient's history were reviewed and updated as appropriate: allergies, current medications, past family history, past medical history, past social history, past surgical history and problem list.    Review of Systems   Constitutional: Negative for chills. Gastrointestinal: Negative for nausea.          Current Outpatient Medications   Medication Sig Dispense Refill   • Calcium Citrate-Vitamin D (CITRACAL MAXIMUM) 315-250 MG-UNIT TABS Take by mouth     • Magnesium 250 MG TABS Take by mouth in the morning     • Melatonin 3 MG CAPS Per patient taking 10MG daily     • sulfamethoxazole-trimethoprim (BACTRIM DS) 800-160 mg per tablet Take 1 tablet by mouth 2 (two) times a day for 3 days 6 tablet 0   • Multiple Vitamin-Folic Acid TABS Take by mouth (Patient not taking: Reported on 9/26/2023)       No current facility-administered medications for this visit. Objective:    /64   Pulse 62   Temp (!) 96.8 °F (36 °C)   Resp 18   Wt 62 kg (136 lb 9.6 oz)   BMI 25.81 kg/m²        Physical Exam  Vitals and nursing note reviewed. Constitutional:       General: She is not in acute distress. Appearance: She is well-developed. She is not ill-appearing. HENT:      Head: Normocephalic. Right Ear: Tympanic membrane normal.      Left Ear: Tympanic membrane normal.   Eyes:      General: No scleral icterus. Conjunctiva/sclera: Conjunctivae normal.   Cardiovascular:      Rate and Rhythm: Normal rate and regular rhythm. Heart sounds: No murmur heard. Pulmonary:      Effort: Pulmonary effort is normal. No respiratory distress. Breath sounds: No wheezing. Abdominal:      Palpations: Abdomen is soft. Tenderness: There is no abdominal tenderness. There is no right CVA tenderness, left CVA tenderness or rebound. Musculoskeletal:         General: No deformity. Cervical back: Neck supple. Right lower leg: No edema. Left lower leg: No edema. Skin:     General: Skin is warm and dry. Coloration: Skin is not pale. Neurological:      Mental Status: She is alert. Motor: No weakness. Gait: Gait normal.   Psychiatric:         Mood and Affect: Mood normal.         Behavior: Behavior normal.         Thought Content: Thought content normal.         BMI Counseling: Body mass index is 25.81 kg/m². The BMI is above normal. Nutrition recommendations include decreasing portion sizes and moderation in carbohydrate intake. Exercise recommendations include exercising 3-5 times per week. No pharmacotherapy was ordered.  Rationale for BMI follow-up plan is due to patient being overweight or obese.             Sonja Martinez, DO

## 2023-09-27 ENCOUNTER — OFFICE VISIT (OUTPATIENT)
Dept: GASTROENTEROLOGY | Facility: CLINIC | Age: 73
End: 2023-09-27
Payer: COMMERCIAL

## 2023-09-27 ENCOUNTER — TELEPHONE (OUTPATIENT)
Dept: GASTROENTEROLOGY | Facility: CLINIC | Age: 73
End: 2023-09-27

## 2023-09-27 VITALS
SYSTOLIC BLOOD PRESSURE: 131 MMHG | HEIGHT: 61 IN | BODY MASS INDEX: 25.68 KG/M2 | OXYGEN SATURATION: 97 % | HEART RATE: 65 BPM | WEIGHT: 136 LBS | DIASTOLIC BLOOD PRESSURE: 83 MMHG

## 2023-09-27 DIAGNOSIS — Z80.0 FAMILY HISTORY OF COLON CANCER: ICD-10-CM

## 2023-09-27 DIAGNOSIS — K21.00 GASTROESOPHAGEAL REFLUX DISEASE WITH ESOPHAGITIS, UNSPECIFIED WHETHER HEMORRHAGE: Primary | ICD-10-CM

## 2023-09-27 DIAGNOSIS — R07.89 ATYPICAL CHEST PAIN: ICD-10-CM

## 2023-09-27 PROBLEM — Z91.89 FRAMINGHAM CARDIAC RISK <10% IN NEXT 10 YEARS: Status: RESOLVED | Noted: 2019-01-02 | Resolved: 2023-09-27

## 2023-09-27 PROCEDURE — 99214 OFFICE O/P EST MOD 30 MIN: CPT | Performed by: INTERNAL MEDICINE

## 2023-09-27 RX ORDER — FAMOTIDINE 20 MG/1
20 TABLET, FILM COATED ORAL 2 TIMES DAILY
Qty: 60 TABLET | Refills: 3 | Status: SHIPPED | OUTPATIENT
Start: 2023-09-27

## 2023-09-27 NOTE — PROGRESS NOTES
[facility] - Outpatient Follow-up Note  Felicia Mena 71 y.o. female MRN: [id number]  Encounter: [id number]          ASSESSMENT AND PLAN:      1. Gastroesophageal reflux disease with esophagitis, unspecified whether hemorrhage  Patient is having increasing heartburn and indigestion. She is having pain and discomfort in the lower chest.  This is mostly postprandial.  There is no nausea or vomiting. She denies any dysphagia or odynophagia. Recently she has not had any weight loss or weight gain. Her abdominal examination is unremarkable. Chest is clear to auscultation percussion. There is bilateral air entry. Cardiovascular exams is unremarkable. There is no focal tenderness at costochondral junction. Patient has history of reflux. Upper endoscopy will be scheduled for evaluation.    - EGD; Future    2. Atypical chest pain  Patient appears to have atypical chest pain. Pain is mostly reflux symptoms. Peptic disease cannot be excluded. - EGD; Future    3. Family history of colon cancer  Family history of colon cancer and grandfather.    ______________________________________________________________________    SUBJECTIVE: Chest pain and discomfort. Mostly postprandial pain. Heartburn and indigestion. There is no dysphagia odynophagia. There is no nausea or vomiting. REVIEW OF SYSTEMS IS OTHERWISE NEGATIVE.       Historical Information   Past Medical History:   Diagnosis Date   • Arthritis    • Colon polyp    • Concussion     Last Assessed: 2013   • GERD (gastroesophageal reflux disease)    • Nonallopathic lesion of upper extremities 2008   • Other bursitis of hip, unspecified hip 2008   • Wears glasses      Past Surgical History:   Procedure Laterality Date   • APPENDECTOMY     •  SECTION     • COLONOSCOPY     • DILATION AND CURETTAGE OF UTERUS     • EGD     • INCISION TENDON SHEATH HAND      Finger   • TONSILLECTOMY     • TUBAL LIGATION Social History   Social History     Substance and Sexual Activity   Alcohol Use Yes    Comment: rare     Social History     Substance and Sexual Activity   Drug Use No     Social History     Tobacco Use   Smoking Status Never   • Passive exposure: Never   Smokeless Tobacco Never     Family History   Problem Relation Age of Onset   • Fibromyalgia Mother    • Arthritis Mother    • Hypertension Mother    • Pancreatic cancer Mother    • Liver cancer Mother    • Lung cancer Mother    • Cancer Maternal Aunt    • Breast cancer Maternal Aunt 48   • Cancer Maternal Uncle    • Prostate cancer Maternal Uncle    • Heart disease Maternal Grandmother    • Colon cancer Maternal Grandfather    • Heart disease Maternal Uncle    • Diverticulitis Half-Brother        Meds/Allergies       Current Outpatient Medications:   •  Calcium Citrate-Vitamin D (CITRACAL MAXIMUM) 315-250 MG-UNIT TABS  •  Magnesium 250 MG TABS  •  Melatonin 3 MG CAPS  •  sulfamethoxazole-trimethoprim (BACTRIM DS) 800-160 mg per tablet  •  Multiple Vitamin-Folic Acid TABS    No Known Allergies        Objective     Blood pressure 131/83, pulse 65, height 5' 1" (1.549 m), weight 61.7 kg (136 lb), SpO2 97 %. Body mass index is 25.7 kg/m². PHYSICAL EXAM:      General Appearance:   Alert, cooperative, no distress   HEENT:   Normocephalic, atraumatic, anicteric.     Neck:  Supple, symmetrical, trachea midline   Lungs:   Clear to auscultation bilaterally; no rales, rhonchi or wheezing; respirations unlabored    Heart[de-identified]   Regular rate and rhythm; no murmur, rub, or gallop. Abdomen:   Soft, non-tender, non-distended; normal bowel sounds; no masses, no organomegaly    Genitalia:   Deferred    Rectal:   Deferred    Extremities:  No cyanosis, clubbing or edema    Pulses:  2+ and symmetric    Skin:  No jaundice, rashes, or lesions    Lymph nodes:  No palpable cervical lymphadenopathy        Lab Results:   No visits with results within 1 Day(s) from this visit. Latest known visit with results is:   Office Visit on 09/26/2023   Component Date Value   •  COLOR,UA 09/26/2023 yellow    • CLARITY,UA 09/26/2023 clear    • SPECIFIC GRAVITY,UA 09/26/2023 1.025    •  PH,UA 09/26/2023 7.0    • LEUKOCYTE ESTERASE,UA 09/26/2023 trace    • NITRITE,UA 09/26/2023 negative    • GLUCOSE, UA 09/26/2023 negative    • KETONES,UA 09/26/2023 negative    • BILIRUBIN,UA 09/26/2023 negative    • BLOOD,UA 09/26/2023 negative    • POCT URINE PROTEIN 09/26/2023 negative    • SL AMB POCT UROBILINOGEN 09/26/2023 0.2          Radiology Results:   No results found. Answers for HPI/ROS submitted by the patient on 9/26/2023  Chronicity: recurrent  Onset quality: undetermined  Frequency: intermittently  Progression since onset: unchanged  Pain quality: sharp  Radiates to: does not radiate  anorexia: No  arthralgias: No  belching: No  constipation: No  diarrhea: No  dysuria: No  fever: No  flatus: No  frequency: No  headaches: No  hematochezia: No  hematuria: No  melena: No  myalgias: No  nausea:  No  weight loss: No  vomiting: No  Aggravated by: nothing  Relieved by: nothing  Diagnostic workup: CT scan, lower endoscopy

## 2023-09-27 NOTE — PATIENT INSTRUCTIONS
Medicare Preventive Visit Patient Instructions  Thank you for completing your Welcome to Medicare Visit or Medicare Annual Wellness Visit today. Your next wellness visit will be due in one year (9/27/2024). The screening/preventive services that you may require over the next 5-10 years are detailed below. Some tests may not apply to you based off risk factors and/or age. Screening tests ordered at today's visit but not completed yet may show as past due. Also, please note that scanned in results may not display below. Preventive Screenings:  Service Recommendations Previous Testing/Comments   Colorectal Cancer Screening  * Colonoscopy    * Fecal Occult Blood Test (FOBT)/Fecal Immunochemical Test (FIT)  * Fecal DNA/Cologuard Test  * Flexible Sigmoidoscopy Age: 43-73 years old   Colonoscopy: every 10 years (may be performed more frequently if at higher risk)  OR  FOBT/FIT: every 1 year  OR  Cologuard: every 3 years  OR  Sigmoidoscopy: every 5 years  Screening may be recommended earlier than age 39 if at higher risk for colorectal cancer. Also, an individualized decision between you and your healthcare provider will decide whether screening between the ages of 77-80 would be appropriate. Colonoscopy: 07/25/2023  FOBT/FIT: Not on file  Cologuard: Not on file  Sigmoidoscopy: Not on file          Breast Cancer Screening Age: 36 years old  Frequency: every 1-2 years  Not required if history of left and right mastectomy Mammogram: 04/12/2022        Cervical Cancer Screening Between the ages of 21-29, pap smear recommended once every 3 years. Between the ages of 32-69, can perform pap smear with HPV co-testing every 5 years.    Recommendations may differ for women with a history of total hysterectomy, cervical cancer, or abnormal pap smears in past. Pap Smear: Not on file        Hepatitis C Screening Once for adults born between [address] [place] [address]  More frequently in patients at high risk for Hepatitis C Hep C Antibody: Not on file        Diabetes Screening 1-2 times per year if you're at risk for diabetes or have pre-diabetes Fasting glucose: No results in last 5 years (No results in last 5 years)  A1C: No results in last 5 years (No results in last 5 years)      Cholesterol Screening Once every 5 years if you don't have a lipid disorder. May order more often based on risk factors. Lipid panel: 01/24/2020          Other Preventive Screenings Covered by Medicare:  1. Abdominal Aortic Aneurysm (AAA) Screening: covered once if your at risk. You're considered to be at risk if you have a family history of AAA. 2. Lung Cancer Screening: covers low dose CT scan once per year if you meet all of the following conditions: (1) Age 48-67; (2) No signs or symptoms of lung cancer; (3) Current smoker or have quit smoking within the last 15 years; (4) You have a tobacco smoking history of at least 20 pack years (packs per day multiplied by number of years you smoked); (5) You get a written order from a healthcare provider. 3. Glaucoma Screening: covered annually if you're considered high risk: (1) You have diabetes OR (2) Family history of glaucoma OR (3)  aged 48 and older OR (3)  American aged 72 and older  3. Osteoporosis Screening: covered every 2 years if you meet one of the following conditions: (1) You're estrogen deficient and at risk for osteoporosis based off medical history and other findings; (2) Have a vertebral abnormality; (3) On glucocorticoid therapy for more than 3 months; (4) Have primary hyperparathyroidism; (5) On osteoporosis medications and need to assess response to drug therapy. · Last bone density test (DXA Scan): 06/23/2022.  5. HIV Screening: covered annually if you're between the age of 15-65. Also covered annually if you are younger than 13 and older than 72 with risk factors for HIV infection. For pregnant patients, it is covered up to 3 times per pregnancy.     Immunizations:  Immunization Recommendations   Influenza Vaccine Annual influenza vaccination during flu season is recommended for all persons aged >= 6 months who do not have contraindications   Pneumococcal Vaccine   * Pneumococcal conjugate vaccine = PCV13 (Prevnar 13), PCV15 (Vaxneuvance), PCV20 (Prevnar 20)  * Pneumococcal polysaccharide vaccine = PPSV23 (Pneumovax) Adults 20-63 years old: 1-3 doses may be recommended based on certain risk factors  Adults 72 years old: 1-2 doses may be recommended based off what pneumonia vaccine you previously received   Hepatitis B Vaccine 3 dose series if at intermediate or high risk (ex: diabetes, end stage renal disease, liver disease)   Tetanus (Td) Vaccine - COST NOT COVERED BY MEDICARE PART B Following completion of primary series, a booster dose should be given every 10 years to maintain immunity against tetanus. Td may also be given as tetanus wound prophylaxis. Tdap Vaccine - COST NOT COVERED BY MEDICARE PART B Recommended at least once for all adults. For pregnant patients, recommended with each pregnancy. Shingles Vaccine (Shingrix) - COST NOT COVERED BY MEDICARE PART B  2 shot series recommended in those aged 48 and above     Health Maintenance Due:      Topic Date Due   • Hepatitis C Screening  Never done   • Breast Cancer Screening: Mammogram  04/12/2023   • Colorectal Cancer Screening  07/23/2028     Immunizations Due:      Topic Date Due   • Pneumococcal Vaccine: 65+ Years (1 - PCV) Never done   • COVID-19 Vaccine (3 - Pfizer series) 07/03/2021   • Influenza Vaccine (1) 09/01/2023     Advance Directives   What are advance directives? Advance directives are legal documents that state your wishes and plans for medical care. These plans are made ahead of time in case you lose your ability to make decisions for yourself. Advance directives can apply to any medical decision, such as the treatments you want, and if you want to donate organs. What are the types of advance directives? There are many types of advance directives, and each state has rules about how to use them. You may choose a combination of any of the following:  · Living will: This is a written record of the treatment you want. You can also choose which treatments you do not want, which to limit, and which to stop at a certain time. This includes surgery, medicine, IV fluid, and tube feedings. · Durable power of  for healthcare [facility]): This is a written record that states who you want to make healthcare choices for you when you are unable to make them for yourself. This person, called a proxy, is usually a family member or a friend. You may choose more than 1 proxy. · Do not resuscitate (DNR) order:  A DNR order is used in case your heart stops beating or you stop breathing. It is a request not to have certain forms of treatment, such as CPR. A DNR order may be included in other types of advance directives. · Medical directive: This covers the care that you want if you are in a coma, near death, or unable to make decisions for yourself. You can list the treatments you want for each condition. Treatment may include pain medicine, surgery, blood transfusions, dialysis, IV or tube feedings, and a ventilator (breathing machine). · Values history: This document has questions about your views, beliefs, and how you feel and think about life. This information can help others choose the care that you would choose. Why are advance directives important? An advance directive helps you control your care. Although spoken wishes may be used, it is better to have your wishes written down. Spoken wishes can be misunderstood, or not followed. Treatments may be given even if you do not want them. An advance directive may make it easier for your family to make difficult choices about your care.    Weight Management   Why it is important to manage your weight:  Being overweight increases your risk of health conditions such as heart disease, high blood pressure, type 2 diabetes, and certain types of cancer. It can also increase your risk for osteoarthritis, sleep apnea, and other respiratory problems. Aim for a slow, steady weight loss. Even a small amount of weight loss can lower your risk of health problems. How to lose weight safely:  A safe and healthy way to lose weight is to eat fewer calories and get regular exercise. You can lose up about 1 pound a week by decreasing the number of calories you eat by 500 calories each day. Healthy meal plan for weight management:  A healthy meal plan includes a variety of foods, contains fewer calories, and helps you stay healthy. A healthy meal plan includes the following:  · Eat whole-grain foods more often. A healthy meal plan should contain fiber. Fiber is the part of grains, fruits, and vegetables that is not broken down by your body. Whole-grain foods are healthy and provide extra fiber in your diet. Some examples of whole-grain foods are whole-wheat breads and pastas, oatmeal, brown rice, and bulgur. · Eat a variety of vegetables every day. Include dark, leafy greens such as spinach, kale, rubina greens, and mustard greens. Eat yellow and orange vegetables such as carrots, sweet potatoes, and winter squash. · Eat a variety of fruits every day. Choose fresh or canned fruit (canned in its own juice or light syrup) instead of juice. Fruit juice has very little or no fiber. · Eat low-fat dairy foods. Drink fat-free (skim) milk or 1% milk. Eat fat-free yogurt and low-fat cottage cheese. Try low-fat cheeses such as mozzarella and other reduced-fat cheeses. · Choose meat and other protein foods that are low in fat. Choose beans or other legumes such as split peas or lentils. Choose fish, skinless poultry (chicken or turkey), or lean cuts of red meat (beef or pork). Before you cook meat or poultry, cut off any visible fat. · Use less fat and oil. Try baking foods instead of frying them. Add less fat, such as margarine, sour cream, regular salad dressing and mayonnaise to foods. Eat fewer high-fat foods. Some examples of high-fat foods include french fries, doughnuts, ice cream, and cakes. · Eat fewer sweets. Limit foods and drinks that are high in sugar. This includes candy, cookies, regular soda, and sweetened drinks. Exercise:  Exercise at least 30 minutes per day on most days of the week. Some examples of exercise include walking, biking, dancing, and swimming. You can also fit in more physical activity by taking the stairs instead of the elevator or parking farther away from stores. Ask your healthcare provider about the best exercise plan for you. © Copyright [employer] 2018 Information is for End User's use only and may not be sold, redistributed or otherwise used for commercial purposes.  All illustrations and images included in CareNotes® are the copyrighted property of [employer]., Inc. or [address] [place] [address]

## 2023-09-27 NOTE — TELEPHONE ENCOUNTER
Procedure: EGD  Scheduled date of procedure (as of today):12/4/23  Physician performing procedure:Dr. Johan Melendez  Location of procedure:[facility]  Instructions reviewed with patient by: ti  Clearances: n/a

## 2023-09-27 NOTE — PROGRESS NOTES
Assessment and Plan:     Problem List Items Addressed This Visit        Active Problems    Screening for diabetes mellitus (DM)    Relevant Orders    Comprehensive metabolic panel    Screening for cardiovascular, respiratory, and genitourinary diseases    Relevant Orders    Lipid Panel with Direct LDL reflex    Mixed hyperlipidemia    Medicare annual wellness visit, subsequent - Primary    Acute UTI    Relevant Medications    sulfamethoxazole-trimethoprim (BACTRIM DS) 800-160 mg per tablet   Other Visit Diagnoses     Breast cancer screening by mammogram        Relevant Orders    Mammo screening bilateral w 3d & cad    UTI symptoms        Relevant Orders    POCT urine dip auto non-scope (Completed)           Preventive health issues were discussed with patient, and age appropriate screening tests were ordered as noted in patient's After Visit Summary. Personalized health advice and appropriate referrals for health education or preventive services given if needed, as noted in patient's After Visit Summary.      History of Present Illness:     Patient presents for a Medicare Wellness Visit    HPI   Patient Care Team:  Joann Michaud DO as PCP - [address], DO as PCP [facility]     Review of Systems:     Review of Systems     Problem List:     Patient Active Problem List   Diagnosis   • Benign colon polyp   • Colon, diverticulosis   • GERD (gastroesophageal reflux disease)   • Internal hemorrhoids   • Osteoarthritis of pelvis   • Residual hemorrhoidal skin tags   • Uterovaginal prolapse   • Screening for diabetes mellitus (DM)   • Screening for cardiovascular, respiratory, and genitourinary diseases   • Osteoporosis   • BMI 26.0-26.9,adult   • Mixed hyperlipidemia   • Memory difficulty   • Medicare annual wellness visit, subsequent   • Menopause   • Mild aortic valve regurgitation   • Mild mitral valve regurgitation   • Acute UTI      Past Medical and Surgical History:     Past Medical History:   Diagnosis Date   • Arthritis    • Colon polyp    • Concussion     Last Assessed: 2013   • GERD (gastroesophageal reflux disease)    • Nonallopathic lesion of upper extremities 2008   • Other bursitis of hip, unspecified hip 2008   • Wears glasses      Past Surgical History:   Procedure Laterality Date   • APPENDECTOMY     •  SECTION     • COLONOSCOPY     • DILATION AND CURETTAGE OF UTERUS     • EGD     • INCISION TENDON SHEATH HAND      Finger   • TONSILLECTOMY     • TUBAL LIGATION        Family History:     Family History   Problem Relation Age of Onset   • Fibromyalgia Mother    • Arthritis Mother    • Hypertension Mother    • Pancreatic cancer Mother    • Liver cancer Mother    • Lung cancer Mother    • Cancer Maternal Aunt    • Breast cancer Maternal Aunt 48   • Cancer Maternal Uncle    • Prostate cancer Maternal Uncle    • Heart disease Maternal Grandmother    • Colon cancer Maternal Grandfather    • Heart disease Maternal Uncle    • Diverticulitis Half-Brother       Social History:     Social History     Socioeconomic History   • Marital status:      Spouse name: None   • Number of children: None   • Years of education: None   • Highest education level: None   Occupational History   • None   Tobacco Use   • Smoking status: Never     Passive exposure: Never   • Smokeless tobacco: Never   Vaping Use   • Vaping Use: Never used   Substance and Sexual Activity   • Alcohol use: Yes     Comment: rare   • Drug use: No   • Sexual activity: None   Other Topics Concern   • None   Social History Narrative   • None     Social Determinants of Health     Financial Resource Strain: Low Risk  (2023)    Overall Financial Resource Strain (CARDIA)    • Difficulty of Paying Living Expenses: Not hard at all   Food Insecurity: Not on file   Transportation Needs: No Transportation Needs (2023)    PRAPARE - Transportation    • Lack of Transportation (Medical):  No    • Lack of Transportation (Non-Medical): No   Physical Activity: Not on file   Stress: Not on file   Social Connections: Not on file   Intimate Partner Violence: Not on file   Housing Stability: Not on file      Medications and Allergies:     Current Outpatient Medications   Medication Sig Dispense Refill   • Calcium Citrate-Vitamin D (CITRACAL MAXIMUM) 315-250 MG-UNIT TABS Take by mouth     • Magnesium 250 MG TABS Take by mouth in the morning     • Melatonin 3 MG CAPS Per patient taking 10MG daily     • sulfamethoxazole-trimethoprim (BACTRIM DS) 800-160 mg per tablet Take 1 tablet by mouth 2 (two) times a day for 3 days 6 tablet 0   • Multiple Vitamin-Folic Acid TABS Take by mouth (Patient not taking: Reported on 9/26/2023)       No current facility-administered medications for this visit. No Known Allergies   Immunizations:     Immunization History   Administered Date(s) Administered   • COVID-19 PFIZER VACCINE 0.3 ML IM 04/17/2021, 05/08/2021      Health Maintenance:         Topic Date Due   • Hepatitis C Screening  Never done   • Breast Cancer Screening: Mammogram  04/12/2023   • Colorectal Cancer Screening  07/23/2028         Topic Date Due   • Pneumococcal Vaccine: 65+ Years (1 - PCV) Never done   • COVID-19 Vaccine (3 - Pfizer series) 07/03/2021   • Influenza Vaccine (1) 09/01/2023      Medicare Screening Tests and Risk Assessments:     Taiwo Emerson is here for her Subsequent Wellness visit. Last Medicare Wellness visit information reviewed, patient interviewed and updates made to the record today. Health Risk Assessment:   Patient rates overall health as very good. Patient feels that their physical health rating is same. Patient is very satisfied with their life. Eyesight was rated as same. Hearing was rated as same. Patient feels that their emotional and mental health rating is same. Patients states they are never, rarely angry. Patient states they are sometimes unusually tired/fatigued.  Pain experienced in the last 7 days has been none. Patient states that she has experienced no weight loss or gain in last 6 months. Fall Risk Screening: In the past year, patient has experienced: no history of falling in past year      Urinary Incontinence Screening:   Patient has not leaked urine accidently in the last six months. Home Safety:  Patient does not have trouble with stairs inside or outside of their home. Patient has working smoke alarms and has working carbon monoxide detector. Home safety hazards include: none. Nutrition:   Current diet is Regular. Medications:   Patient is currently taking over-the-counter supplements. OTC medications include: see medication list. Patient is able to manage medications. Activities of Daily Living (ADLs)/Instrumental Activities of Daily Living (IADLs):   Walk and transfer into and out of bed and chair?: Yes  Dress and groom yourself?: Yes    Bathe or shower yourself?: Yes    Feed yourself? Yes  Do your laundry/housekeeping?: Yes  Manage your money, pay your bills and track your expenses?: Yes  Make your own meals?: Yes    Do your own shopping?: Yes    Previous Hospitalizations:   Any hospitalizations or ED visits within the last 12 months?: No      Advance Care Planning:   Living will: No    Durable POA for healthcare:  Yes    Advanced directive: Yes    End of Life Decisions reviewed with patient: No      Cognitive Screening:   Provider or family/friend/caregiver concerned regarding cognition?: No    PREVENTIVE SCREENINGS      Cardiovascular Screening:    General: Screening Not Indicated and History Lipid Disorder      Diabetes Screening:     General: Screening Not Indicated and History Diabetes      Colorectal Cancer Screening:     General: Screening Current      Breast Cancer Screening:     General: Screening Current      Cervical Cancer Screening:    General: Screening Not Indicated      Osteoporosis Screening:    General: Screening Not Indicated and History Osteoporosis      Abdominal Aortic Aneurysm (AAA) Screening:        General: Screening Not Indicated      Lung Cancer Screening:     General: Screening Not Indicated      Hepatitis C Screening:    General: Screening Current    Hep C Screening Accepted: No     Screening, Brief Intervention, and Referral to Treatment (SBIRT)    Screening  Typical number of drinks in a day: 0  Typical number of drinks in a week: 0  Interpretation: Low risk drinking behavior. Single Item Drug Screening:  How often have you used an illegal drug (including marijuana) or a prescription medication for non-medical reasons in the past year? never    Single Item Drug Screen Score: 0  Interpretation: Negative screen for possible drug use disorder    Other Counseling Topics:   Car/seat belt/driving safety and regular weightbearing exercise. No results found.      Physical Exam:     /64   Pulse 62   Temp (!) 96.8 °F (36 °C)   Resp 18   Wt 62 kg (136 lb 9.6 oz)   BMI 25.81 kg/m²     Physical Exam     Brown Spurr, DO

## 2023-10-07 LAB
ALBUMIN SERPL-MCNC: 4.5 G/DL (ref 3.8–4.8)
ALBUMIN/GLOB SERPL: 1.7 {RATIO} (ref 1.2–2.2)
ALP SERPL-CCNC: 82 IU/L (ref 44–121)
ALT SERPL-CCNC: 17 IU/L (ref 0–32)
AST SERPL-CCNC: 19 IU/L (ref 0–40)
BILIRUB SERPL-MCNC: 0.4 MG/DL (ref 0–1.2)
BUN SERPL-MCNC: 18 MG/DL (ref 8–27)
BUN/CREAT SERPL: 25 (ref 12–28)
CALCIUM SERPL-MCNC: 9.7 MG/DL (ref 8.7–10.3)
CHLORIDE SERPL-SCNC: 100 MMOL/L (ref 96–106)
CHOLEST SERPL-MCNC: 242 MG/DL (ref 100–199)
CO2 SERPL-SCNC: 24 MMOL/L (ref 20–29)
CREAT SERPL-MCNC: 0.73 MG/DL (ref 0.57–1)
EGFR: 88 ML/MIN/1.73
GLOBULIN SER-MCNC: 2.7 G/DL (ref 1.5–4.5)
GLUCOSE SERPL-MCNC: 92 MG/DL (ref 70–99)
HDLC SERPL-MCNC: 49 MG/DL
LDLC SERPL CALC-MCNC: 166 MG/DL (ref 0–99)
LDLC/HDLC SERPL: 3.4 RATIO (ref 0–3.2)
MICRODELETION SYND BLD/T FISH: NORMAL
POTASSIUM SERPL-SCNC: 4.9 MMOL/L (ref 3.5–5.2)
PROT SERPL-MCNC: 7.2 G/DL (ref 6–8.5)
SL AMB VLDL CHOLESTEROL CALC: 27 MG/DL (ref 5–40)
SODIUM SERPL-SCNC: 137 MMOL/L (ref 134–144)
TRIGL SERPL-MCNC: 148 MG/DL (ref 0–149)

## 2023-11-21 ENCOUNTER — OFFICE VISIT (OUTPATIENT)
Dept: FAMILY MEDICINE CLINIC | Facility: CLINIC | Age: 73
End: 2023-11-21
Payer: COMMERCIAL

## 2023-11-21 VITALS
DIASTOLIC BLOOD PRESSURE: 64 MMHG | SYSTOLIC BLOOD PRESSURE: 104 MMHG | HEART RATE: 78 BPM | OXYGEN SATURATION: 97 % | BODY MASS INDEX: 25.7 KG/M2 | TEMPERATURE: 97.1 F | HEIGHT: 61 IN | RESPIRATION RATE: 16 BRPM

## 2023-11-21 DIAGNOSIS — R05.2 SUBACUTE COUGH: Primary | ICD-10-CM

## 2023-11-21 PROBLEM — N39.0 ACUTE UTI: Status: RESOLVED | Noted: 2023-09-26 | Resolved: 2023-11-21

## 2023-11-21 PROCEDURE — 99213 OFFICE O/P EST LOW 20 MIN: CPT | Performed by: FAMILY MEDICINE

## 2023-11-21 NOTE — PATIENT INSTRUCTIONS
You can try mucinex DM or equivalent for 10-14 days and if you change your mind about a 6 day course of oral steroids and/or a chest xray, you can let me know.

## 2023-11-21 NOTE — PROGRESS NOTES
Assessment/Plan:    No problem-specific Assessment & Plan notes found for this encounter. Diagnoses and all orders for this visit:    Subacute cough      Suspect post infection cough  Reluctant to use medrol po, risks/benefits discussed  No sign of active infection at present  Suggest mucinex dm, hydration  If calls, could send rx medrol  Possible tessalon  Possible cxr  F/u if no better    You can try mucinex DM or equivalent for 10-14 days and if you change your mind about a 6 day course of oral steroids and/or a chest xray, you can let me know. Return if symptoms worsen or fail to improve. Subjective:      Patient ID: Yovani Tolentino is a 68 y.o. female. Chief Complaint   Patient presents with    Cough     Callum Kauffman MA     tickle in throat       HPI  2m ago on vacation  Was in [place]  Mild "cold" then, runny nose, no sore throat, lasted few days    Ongoing "tickle" for 2m  On/off  Daytime  No waking from sleep  No fever  No leg swelling  No hemoptysis  Ex  coughed for a while but is better    The following portions of the patient's history were reviewed and updated as appropriate: allergies, current medications, past family history, past medical history, past social history, past surgical history and problem list.    Review of Systems   Constitutional:  Negative for chills and fever. Respiratory:  Positive for cough. Negative for shortness of breath and wheezing. Current Outpatient Medications   Medication Sig Dispense Refill    Calcium Citrate-Vitamin D (CITRACAL MAXIMUM) 315-250 MG-UNIT TABS Take by mouth      Magnesium 250 MG TABS Take by mouth in the morning      Melatonin 3 MG CAPS Per patient taking 10MG daily      Multiple Vitamin-Folic Acid TABS Take by mouth       No current facility-administered medications for this visit.        Objective:    /64   Pulse 78   Temp (!) 97.1 °F (36.2 °C)   Resp 16   Ht 5' 1" (1.549 m)   SpO2 97%   BMI 25.70 kg/m² Physical Exam  Vitals and nursing note reviewed. Constitutional:       General: She is not in acute distress. Appearance: She is well-developed. She is not ill-appearing. HENT:      Head: Normocephalic. Right Ear: Tympanic membrane and ear canal normal.      Left Ear: Tympanic membrane and ear canal normal.      Nose: No congestion. Mouth/Throat:      Pharynx: No oropharyngeal exudate or posterior oropharyngeal erythema. Comments: Some post nasal drip  Eyes:      General: No scleral icterus. Conjunctiva/sclera: Conjunctivae normal.   Cardiovascular:      Rate and Rhythm: Normal rate and regular rhythm. Heart sounds: No murmur heard. Pulmonary:      Effort: Pulmonary effort is normal. No respiratory distress. Breath sounds: No stridor. No wheezing or rales. Abdominal:      General: There is no distension. Palpations: Abdomen is soft. Musculoskeletal:         General: No deformity. Cervical back: Neck supple. Right lower leg: No edema. Left lower leg: No edema. Skin:     General: Skin is warm and dry. Coloration: Skin is not pale. Neurological:      Mental Status: She is alert. Motor: No weakness. Gait: Gait normal.   Psychiatric:         Mood and Affect: Mood normal.         Behavior: Behavior normal.         Thought Content:  Thought content normal.                Russell Ortiz DO

## 2023-12-03 RX ORDER — SODIUM CHLORIDE, SODIUM LACTATE, POTASSIUM CHLORIDE, CALCIUM CHLORIDE 600; 310; 30; 20 MG/100ML; MG/100ML; MG/100ML; MG/100ML
75 INJECTION, SOLUTION INTRAVENOUS CONTINUOUS
Status: CANCELLED | OUTPATIENT
Start: 2023-12-03

## 2023-12-04 ENCOUNTER — ANESTHESIA (OUTPATIENT)
Dept: GASTROENTEROLOGY | Facility: AMBULARY SURGERY CENTER | Age: 73
End: 2023-12-04

## 2023-12-04 ENCOUNTER — HOSPITAL ENCOUNTER (OUTPATIENT)
Dept: GASTROENTEROLOGY | Facility: AMBULARY SURGERY CENTER | Age: 73
Setting detail: OUTPATIENT SURGERY
Discharge: HOME | End: 2023-12-04
Attending: INTERNAL MEDICINE
Payer: COMMERCIAL

## 2023-12-04 ENCOUNTER — ANESTHESIA EVENT (OUTPATIENT)
Dept: GASTROENTEROLOGY | Facility: AMBULARY SURGERY CENTER | Age: 73
End: 2023-12-04

## 2023-12-04 VITALS
SYSTOLIC BLOOD PRESSURE: 127 MMHG | OXYGEN SATURATION: 97 % | TEMPERATURE: 97.9 F | HEART RATE: 65 BPM | RESPIRATION RATE: 18 BRPM | DIASTOLIC BLOOD PRESSURE: 73 MMHG

## 2023-12-04 DIAGNOSIS — K21.00 GASTROESOPHAGEAL REFLUX DISEASE WITH ESOPHAGITIS, UNSPECIFIED WHETHER HEMORRHAGE: ICD-10-CM

## 2023-12-04 DIAGNOSIS — R07.89 ATYPICAL CHEST PAIN: ICD-10-CM

## 2023-12-04 PROCEDURE — 43239 EGD BIOPSY SINGLE/MULTIPLE: CPT | Performed by: INTERNAL MEDICINE

## 2023-12-04 PROCEDURE — 88305 TISSUE EXAM BY PATHOLOGIST: CPT | Performed by: PATHOLOGY

## 2023-12-04 RX ORDER — PROPOFOL 10 MG/ML
INJECTION, EMULSION INTRAVENOUS AS NEEDED
Status: DISCONTINUED | OUTPATIENT
Start: 2023-12-04 | End: 2023-12-04

## 2023-12-04 RX ORDER — LIDOCAINE HYDROCHLORIDE 20 MG/ML
INJECTION, SOLUTION EPIDURAL; INFILTRATION; INTRACAUDAL; PERINEURAL AS NEEDED
Status: DISCONTINUED | OUTPATIENT
Start: 2023-12-04 | End: 2023-12-04

## 2023-12-04 RX ORDER — SODIUM CHLORIDE, SODIUM LACTATE, POTASSIUM CHLORIDE, CALCIUM CHLORIDE 600; 310; 30; 20 MG/100ML; MG/100ML; MG/100ML; MG/100ML
75 INJECTION, SOLUTION INTRAVENOUS CONTINUOUS
Status: DISCONTINUED | OUTPATIENT
Start: 2023-12-04 | End: 2023-12-08 | Stop reason: HOSPADM

## 2023-12-04 RX ADMIN — PROPOFOL 20 MG: 10 INJECTION, EMULSION INTRAVENOUS at 09:17

## 2023-12-04 RX ADMIN — SODIUM CHLORIDE, SODIUM LACTATE, POTASSIUM CHLORIDE, AND CALCIUM CHLORIDE: .6; .31; .03; .02 INJECTION, SOLUTION INTRAVENOUS at 08:53

## 2023-12-04 RX ADMIN — PROPOFOL 150 MG: 10 INJECTION, EMULSION INTRAVENOUS at 09:15

## 2023-12-04 RX ADMIN — LIDOCAINE HYDROCHLORIDE 100 MG: 20 INJECTION, SOLUTION EPIDURAL; INFILTRATION; INTRACAUDAL; PERINEURAL at 09:15

## 2023-12-04 NOTE — ANESTHESIA PREPROCEDURE EVALUATION
Procedure:  EGD    Relevant Problems   CARDIO   (+) Mild aortic valve regurgitation   (+) Mild mitral valve regurgitation   (+) Mixed hyperlipidemia (No meds)      GI/HEPATIC   (+) GERD (gastroesophageal reflux disease)      MUSCULOSKELETAL   (+) Osteoarthritis of pelvis        Physical Exam    Airway    Mallampati score: II  TM Distance: >3 FB  Neck ROM: full     Dental       Cardiovascular  Rhythm: regular, Rate: normal    Pulmonary   Breath sounds clear to auscultation    Other Findings  post-pubertal.      Anesthesia Plan  ASA Score- 2     Anesthesia Type- IV sedation with anesthesia with ASA Monitors. Additional Monitors:     Airway Plan:            Plan Factors-    Chart reviewed. Patient is not a current smoker. Induction- intravenous. Postoperative Plan-     Informed Consent- Anesthetic plan and risks discussed with patient. I personally reviewed this patient with the CRNA. Discussed and agreed on the Anesthesia Plan with the CRNA. Cinthya Scott

## 2023-12-04 NOTE — H&P
History and Physical -  [facility]  Grisel Umaña 68 y.o. female MRN: [id number]                  HPI: Grisel Umaña is a 68y.o. year old female who presents for chronic gastroesophageal reflux disease. REVIEW OF SYSTEMS: Per the HPI, and otherwise unremarkable.     Historical Information   Past Medical History:   Diagnosis Date    Arthritis     Colon polyp     Concussion     Last Assessed: 2013    GERD (gastroesophageal reflux disease)     Nonallopathic lesion of upper extremities 2008    Other bursitis of hip, unspecified hip 2008    Wears glasses      Past Surgical History:   Procedure Laterality Date    APPENDECTOMY       SECTION      COLONOSCOPY      DILATION AND CURETTAGE OF UTERUS      EGD      INCISION TENDON SHEATH HAND      Finger    TONSILLECTOMY      TUBAL LIGATION       Social History   Social History     Substance and Sexual Activity   Alcohol Use Yes    Comment: rare     Social History     Substance and Sexual Activity   Drug Use No     Social History     Tobacco Use   Smoking Status Never    Passive exposure: Never   Smokeless Tobacco Never     Family History   Problem Relation Age of Onset    Fibromyalgia Mother     Arthritis Mother     Hypertension Mother     Pancreatic cancer Mother     Liver cancer Mother     Lung cancer Mother     Cancer Maternal Aunt     Breast cancer Maternal Aunt 48    Cancer Maternal Uncle     Prostate cancer Maternal Uncle     Heart disease Maternal Grandmother     Colon cancer Maternal Grandfather     Heart disease Maternal Uncle     Diverticulitis Half-Brother        Meds/Allergies       Current Outpatient Medications:     Calcium Citrate-Vitamin D (CITRACAL MAXIMUM) 315-250 MG-UNIT TABS    Magnesium 250 MG TABS    Melatonin 3 MG CAPS    Multiple Vitamin-Folic Acid TABS    Current Facility-Administered Medications:     lactated ringers infusion, 75 mL/hr, Intravenous, Continuous, Continue from Pre-op at 23 0914    No Known Allergies    Objective     Pulse 66   Temp 97.9 °F (36.6 °C) (Temporal)   Resp 16   SpO2 (!) 2%       PHYSICAL EXAM    Gen: NAD  Head: NCAT  CV: RRR  CHEST: Clear  ABD: soft, NT/ND  EXT: no edema      ASSESSMENT/PLAN:  This is a 68y.o. year old female here for EGD, and she is stable and optimized for her procedure.

## 2023-12-04 NOTE — DISCHARGE SUMMARY
Discharge Summary - Stefan Haro 68 y.o. female MRN: [id number]    Unit/Bed#:  Encounter: [id number]    Admission Date: 12/4/2023    Admitting Diagnosis: Gastroesophageal reflux disease with esophagitis, unspecified whether hemorrhage [K21.00]  Atypical chest pain [R07.89]    HPI: History of reflux    Procedures Performed: No orders of the defined types were placed in this encounter. Summary of Hospital Course: Tolerated procedure well    Significant Findings, Care, Treatment and Services Provided: No obvious ulceration. Tiny hiatal hernia noted. Complications: None    Discharge Diagnosis: See above    Medical Problems       Resolved Problems  Date Reviewed: 11/21/2023   None         Condition at Discharge: good         Discharge instructions/Information to patient and family:   See after visit summary for information provided to patient and family. Provisions for Follow-Up Care:  See after visit summary for information related to follow-up care and any pertinent home health orders.       PCP: Joann Michaud DO    Disposition: Home

## 2023-12-04 NOTE — INTERVAL H&P NOTE
H&P reviewed. After examining the patient I find no changes in the patients condition since the H&P had been written.     Vitals:    12/04/23 0756   Pulse: 66   Resp: 16   Temp: 97.9 °F (36.6 °C)   SpO2: (!) 2%

## 2023-12-07 PROCEDURE — 88305 TISSUE EXAM BY PATHOLOGIST: CPT | Performed by: PATHOLOGY

## 2024-01-20 PROBLEM — R05.2 SUBACUTE COUGH: Status: RESOLVED | Noted: 2023-11-21 | Resolved: 2024-01-20

## 2024-01-25 ENCOUNTER — OFFICE VISIT (OUTPATIENT)
Dept: FAMILY MEDICINE CLINIC | Facility: CLINIC | Age: 74
End: 2024-01-25
Payer: COMMERCIAL

## 2024-01-25 VITALS
HEART RATE: 66 BPM | WEIGHT: 134.6 LBS | TEMPERATURE: 97.5 F | HEIGHT: 61 IN | BODY MASS INDEX: 25.41 KG/M2 | RESPIRATION RATE: 17 BRPM | SYSTOLIC BLOOD PRESSURE: 132 MMHG | DIASTOLIC BLOOD PRESSURE: 82 MMHG

## 2024-01-25 DIAGNOSIS — R39.9 UTI SYMPTOMS: Primary | ICD-10-CM

## 2024-01-25 LAB
SL AMB  POCT GLUCOSE, UA: NEGATIVE
SL AMB LEUKOCYTE ESTERASE,UA: NORMAL
SL AMB POCT BILIRUBIN,UA: NEGATIVE
SL AMB POCT BLOOD,UA: NORMAL
SL AMB POCT CLARITY,UA: CLEAR
SL AMB POCT COLOR,UA: NORMAL
SL AMB POCT KETONES,UA: NEGATIVE
SL AMB POCT NITRITE,UA: NEGATIVE
SL AMB POCT PH,UA: 7.5
SL AMB POCT SPECIFIC GRAVITY,UA: 1.02
SL AMB POCT URINE PROTEIN: NEGATIVE
SL AMB POCT UROBILINOGEN: NORMAL

## 2024-01-25 PROCEDURE — 99214 OFFICE O/P EST MOD 30 MIN: CPT | Performed by: FAMILY MEDICINE

## 2024-01-25 PROCEDURE — 87186 SC STD MICRODIL/AGAR DIL: CPT | Performed by: FAMILY MEDICINE

## 2024-01-25 PROCEDURE — 87077 CULTURE AEROBIC IDENTIFY: CPT | Performed by: FAMILY MEDICINE

## 2024-01-25 PROCEDURE — 87086 URINE CULTURE/COLONY COUNT: CPT | Performed by: FAMILY MEDICINE

## 2024-01-25 PROCEDURE — 81002 URINALYSIS NONAUTO W/O SCOPE: CPT | Performed by: FAMILY MEDICINE

## 2024-01-25 RX ORDER — SULFAMETHOXAZOLE AND TRIMETHOPRIM 800; 160 MG/1; MG/1
1 TABLET ORAL 2 TIMES DAILY
Qty: 6 TABLET | Refills: 0 | Status: SHIPPED | OUTPATIENT
Start: 2024-01-25 | End: 2024-01-28

## 2024-01-25 NOTE — PROGRESS NOTES
"Name: Helga Max      : 1950      MRN: [id number]  Encounter Provider: Belinda South MD  Encounter Date: 2024   Encounter department: [facility]    Assessment & Plan     1. UTI symptoms  -     POCT urine dip  -     Urine culture  -     sulfamethoxazole-trimethoprim (BACTRIM DS) 800-160 mg per tablet; Take 1 tablet by mouth 2 (two) times a day for 3 days           Subjective      Urinary Frequency   This is a new problem. The current episode started yesterday. The problem has been unchanged. Quality: pressure. There has been no fever. Associated symptoms include frequency. Pertinent negatives include no chills, discharge, flank pain, hematuria, hesitancy, nausea, possible pregnancy, sweats, urgency or vomiting. She has tried nothing for the symptoms.     Review of Systems   Constitutional: Negative.  Negative for chills.   HENT: Negative.     Eyes: Negative.    Respiratory: Negative.     Cardiovascular: Negative.    Gastrointestinal: Negative.  Negative for nausea and vomiting.   Endocrine: Negative.    Genitourinary:  Positive for frequency. Negative for flank pain, hematuria, hesitancy and urgency.   Musculoskeletal: Negative.    Skin: Negative.    Allergic/Immunologic: Negative.    Neurological: Negative.    Hematological: Negative.    Psychiatric/Behavioral: Negative.         Current Outpatient Medications on File Prior to Visit   Medication Sig    Calcium Citrate-Vitamin D (CITRACAL MAXIMUM) 315-250 MG-UNIT TABS Take by mouth    Magnesium 250 MG TABS Take by mouth in the morning    Melatonin 3 MG CAPS Per patient taking 10MG daily    Multiple Vitamin-Folic Acid TABS Take by mouth       Objective     /82   Pulse 66   Temp 97.5 °F (36.4 °C)   Resp 17   Ht 5' 1\" (1.549 m)   Wt 61.1 kg (134 lb 9.6 oz)   BMI 25.43 kg/m²     Physical Exam  Constitutional:       General: She is not in acute distress.     Appearance: She is well-developed. She is not diaphoretic.   HENT:      " Head: Normocephalic and atraumatic.   Cardiovascular:      Rate and Rhythm: Normal rate and regular rhythm.      Heart sounds: Normal heart sounds. No murmur heard.     No friction rub. No gallop.   Pulmonary:      Effort: Pulmonary effort is normal. No respiratory distress.      Breath sounds: Normal breath sounds. No wheezing or rales.   Chest:      Chest wall: No tenderness.   Abdominal:      General: Abdomen is flat. Bowel sounds are normal. There is no distension.      Palpations: Abdomen is soft. There is no mass.      Tenderness: There is no abdominal tenderness. There is no right CVA tenderness, left CVA tenderness, guarding or rebound.      Hernia: No hernia is present.   Musculoskeletal:         General: No deformity. Normal range of motion.      Cervical back: Normal range of motion and neck supple.   Skin:     General: Skin is warm and dry.   Neurological:      Mental Status: She is alert and oriented to person, place, and time.   Psychiatric:         Behavior: Behavior normal.         Thought Content: Thought content normal.         Judgment: Judgment normal.       Belinda South MD

## 2024-01-28 LAB — BACTERIA UR CULT: ABNORMAL

## 2024-01-31 ENCOUNTER — RA CDI HCC (OUTPATIENT)
Dept: OTHER | Facility: HOSPITAL | Age: 74
End: 2024-01-31

## 2024-01-31 ENCOUNTER — OFFICE VISIT (OUTPATIENT)
Dept: FAMILY MEDICINE CLINIC | Facility: CLINIC | Age: 74
End: 2024-01-31
Payer: COMMERCIAL

## 2024-01-31 VITALS
WEIGHT: 130.8 LBS | DIASTOLIC BLOOD PRESSURE: 60 MMHG | RESPIRATION RATE: 16 BRPM | HEIGHT: 61 IN | BODY MASS INDEX: 24.7 KG/M2 | SYSTOLIC BLOOD PRESSURE: 108 MMHG | TEMPERATURE: 98.3 F | OXYGEN SATURATION: 99 % | HEART RATE: 80 BPM

## 2024-01-31 DIAGNOSIS — U07.1 COVID-19 VIRUS INFECTION: Primary | ICD-10-CM

## 2024-01-31 DIAGNOSIS — U07.1 COVID-19: ICD-10-CM

## 2024-01-31 PROCEDURE — 99213 OFFICE O/P EST LOW 20 MIN: CPT | Performed by: NURSE PRACTITIONER

## 2024-01-31 PROCEDURE — 87635 SARS-COV-2 COVID-19 AMP PRB: CPT | Performed by: NURSE PRACTITIONER

## 2024-01-31 NOTE — PROGRESS NOTES
COVID-19 Outpatient Progress Note    Assessment/Plan:    Problem List Items Addressed This Visit    None  Visit Diagnoses     COVID-19 virus infection    -  Primary    COVID-19        Relevant Orders    COVID Only - Office Collect           Disposition:     Patient with asymptomatic/mild COVID-19: They were recommended to isolate for at least 5 days (day 0 is the day symptoms appeared or the date the specimen was collected for the positive test for people who are asymptomatic). If they are asymptomatic or symptoms are improving with no fevers in the past 24 hours, isolation may be ended followed by 5 days of wearing a high quality mask when around others to minimize risk of infecting others. They should wear a mask through day 10 and a test-based strategy may be used to remove a mask sooner.      Discussed symptom directed medication options with patient. Discussed vitamin D, vitamin C, and/or zinc supplementation with patient.     PCR sent    I have spent a total time of 14 minutes on the day of the encounter for this patient including       Encounter provider: GILBERT Maldonado     Provider located at: [address] [address]     Recent Visits  Date Type Provider Dept   01/25/24 Office Visit Belinda South MD [facility]   Showing recent visits within past 7 days and meeting all other requirements  Today's Visits  Date Type Provider Dept   01/31/24 Office Visit GILBERT Maldonado [facility]   Showing today's visits and meeting all other requirements  Future Appointments  No visits were found meeting these conditions.  Showing future appointments within next 150 days and meeting all other requirements     Subjective:   Helga Max is a 73 y.o. female who has been screened for COVID-19. Symptom change since last report: unchanged. Patient's symptoms include nasal congestion, sore throat and nausea. Patient denies fever,  "chills, fatigue, malaise, rhinorrhea, anosmia, loss of taste, cough, shortness of breath, chest tightness, abdominal pain, vomiting, diarrhea, myalgias and headaches.     - Date of symptom onset: 1/28/2024  - Date of positive COVID-19 test: 1/30/2024. Type of test: Home antigen. Patient with typical symptoms of COVID-19 and they attest that they were positive on home rapid antigen testing. Image of positive result is not able to be uploaded into their chart.     COVID-19 vaccination status: Fully vaccinated (primary series)    Helga has been staying home and has isolated themselves in her home. She is taking care to not share personal items and is cleaning all surfaces that are touched often, like counters, tabletops, and doorknobs using household cleaning sprays or wipes. She is wearing a mask when she leaves her room.     Has had Covid in the past, no recent boosters.  Would like confirmatory testing     Lab Results   Component Value Date    SARSCOV2 Positive (A) 12/22/2021       Review of Systems   Constitutional:  Negative for chills, fatigue and fever.   HENT:  Positive for congestion and sore throat. Negative for rhinorrhea.    Respiratory:  Negative for cough, chest tightness and shortness of breath.    Gastrointestinal:  Positive for nausea. Negative for abdominal pain, diarrhea and vomiting.   Musculoskeletal:  Negative for myalgias.   Neurological:  Negative for headaches.     Current Outpatient Medications on File Prior to Visit   Medication Sig   • Calcium Citrate-Vitamin D (CITRACAL MAXIMUM) 315-250 MG-UNIT TABS Take by mouth   • Magnesium 250 MG TABS Take by mouth in the morning   • Melatonin 3 MG CAPS Per patient taking 10MG daily   • Multiple Vitamin-Folic Acid TABS Take by mouth       Objective:    /60   Pulse 80   Temp 98.3 °F (36.8 °C) (Temporal)   Resp 16   Ht 5' 1\" (1.549 m)   Wt 59.3 kg (130 lb 12.8 oz)   SpO2 99%   BMI 24.71 kg/m²        Physical Exam  Vitals and nursing note " reviewed.   Constitutional:       General: She is not in acute distress.     Appearance: Normal appearance. She is well-developed.   HENT:      Head: Normocephalic and atraumatic.   Eyes:      Conjunctiva/sclera: Conjunctivae normal.   Neck:      Vascular: No carotid bruit.   Cardiovascular:      Rate and Rhythm: Normal rate and regular rhythm.      Pulses: Normal pulses.      Heart sounds: Normal heart sounds. No murmur heard.  Pulmonary:      Effort: Pulmonary effort is normal.      Breath sounds: Normal breath sounds.   Skin:     General: Skin is warm and dry.   Neurological:      Mental Status: She is alert.   Psychiatric:         Mood and Affect: Mood normal.         Behavior: Behavior normal.       GILBERT Maldonado

## 2024-02-01 LAB — SARS-COV-2 RNA RESP QL NAA+PROBE: POSITIVE

## 2024-02-21 PROBLEM — Z13.6 SCREENING FOR CARDIOVASCULAR, RESPIRATORY, AND GENITOURINARY DISEASES: Status: RESOLVED | Noted: 2018-07-12 | Resolved: 2024-02-21

## 2024-02-21 PROBLEM — Z13.1 SCREENING FOR DIABETES MELLITUS (DM): Status: RESOLVED | Noted: 2018-07-12 | Resolved: 2024-02-21

## 2024-02-21 PROBLEM — Z13.89 SCREENING FOR CARDIOVASCULAR, RESPIRATORY, AND GENITOURINARY DISEASES: Status: RESOLVED | Noted: 2018-07-12 | Resolved: 2024-02-21

## 2024-02-21 PROBLEM — Z00.00 MEDICARE ANNUAL WELLNESS VISIT, SUBSEQUENT: Status: RESOLVED | Noted: 2020-05-01 | Resolved: 2024-02-21

## 2024-02-21 PROBLEM — Z13.83 SCREENING FOR CARDIOVASCULAR, RESPIRATORY, AND GENITOURINARY DISEASES: Status: RESOLVED | Noted: 2018-07-12 | Resolved: 2024-02-21

## 2024-02-29 ENCOUNTER — OFFICE VISIT (OUTPATIENT)
Dept: FAMILY MEDICINE CLINIC | Facility: CLINIC | Age: 74
End: 2024-02-29
Payer: COMMERCIAL

## 2024-02-29 VITALS
HEART RATE: 64 BPM | BODY MASS INDEX: 25.86 KG/M2 | TEMPERATURE: 98.1 F | RESPIRATION RATE: 16 BRPM | SYSTOLIC BLOOD PRESSURE: 120 MMHG | DIASTOLIC BLOOD PRESSURE: 60 MMHG | WEIGHT: 137 LBS | HEIGHT: 61 IN | OXYGEN SATURATION: 97 %

## 2024-02-29 DIAGNOSIS — R10.9 RIGHT FLANK PAIN: Primary | ICD-10-CM

## 2024-02-29 LAB
SL AMB  POCT GLUCOSE, UA: 0
SL AMB LEUKOCYTE ESTERASE,UA: ABNORMAL
SL AMB POCT BILIRUBIN,UA: 0
SL AMB POCT BLOOD,UA: ABNORMAL
SL AMB POCT CLARITY,UA: CLEAR
SL AMB POCT COLOR,UA: YELLOW
SL AMB POCT KETONES,UA: 0
SL AMB POCT NITRITE,UA: 0
SL AMB POCT PH,UA: 6
SL AMB POCT SPECIFIC GRAVITY,UA: 1.03
SL AMB POCT URINE PROTEIN: 0
SL AMB POCT UROBILINOGEN: 0.2

## 2024-02-29 PROCEDURE — 87086 URINE CULTURE/COLONY COUNT: CPT | Performed by: FAMILY MEDICINE

## 2024-02-29 PROCEDURE — 81002 URINALYSIS NONAUTO W/O SCOPE: CPT | Performed by: FAMILY MEDICINE

## 2024-02-29 PROCEDURE — 99214 OFFICE O/P EST MOD 30 MIN: CPT | Performed by: FAMILY MEDICINE

## 2024-02-29 NOTE — PROGRESS NOTES
"Name: Helga Max      : 1950      MRN: [id number]  Encounter Provider: Belinda South MD  Encounter Date: 2024   Encounter department: [facility]    Assessment & Plan     1. Right flank pain  -     POCT urine dip  -     Urine culture  -     CT renal stone study abdomen pelvis wo contrast; Future; Expected date: 2024    Unclear etiology of right flank pain x 1 month. Urine dip with no significant abnormalities other than trace-intact blood and small leukocytes. Will send for culture. She has no UTI symptoms so she would like to hold off on starting an antibiotic.   If culture is negative, she will go ahead and schedule CT renal study.        Subjective      HPI  She reports 1 month history of right sided flank pain. It is tender in the area. Denies fevers, chills, urgency, frequency, hematuria, dysuria.   The pain is intermittent, 4/10 intensity, non radiating.   No known injury.     Review of Systems   Constitutional: Negative.    HENT: Negative.     Eyes: Negative.    Respiratory: Negative.     Cardiovascular: Negative.    Gastrointestinal: Negative.    Endocrine: Negative.    Genitourinary:  Positive for flank pain.   Skin: Negative.    Allergic/Immunologic: Negative.    Neurological: Negative.    Hematological: Negative.    Psychiatric/Behavioral: Negative.         Current Outpatient Medications on File Prior to Visit   Medication Sig    Calcium Citrate-Vitamin D (CITRACAL MAXIMUM) 315-250 MG-UNIT TABS Take by mouth    Magnesium 250 MG TABS Take by mouth in the morning    Melatonin 3 MG CAPS Per patient taking 10MG daily    Multiple Vitamin-Folic Acid TABS Take by mouth       Objective     /60   Pulse 64   Temp 98.1 °F (36.7 °C) (Temporal)   Resp 16   Ht 5' 1\" (1.549 m)   Wt 62.1 kg (137 lb)   SpO2 97%   BMI 25.89 kg/m²     Physical Exam  Constitutional:       General: She is not in acute distress.     Appearance: She is well-developed. She is not diaphoretic.   HENT: "      Head: Normocephalic and atraumatic.   Cardiovascular:      Rate and Rhythm: Normal rate and regular rhythm.      Heart sounds: Normal heart sounds. No murmur heard.     No friction rub. No gallop.   Pulmonary:      Effort: Pulmonary effort is normal. No respiratory distress.      Breath sounds: Normal breath sounds. No wheezing or rales.   Chest:      Chest wall: No tenderness.   Abdominal:      General: Abdomen is flat. Bowel sounds are normal. There is no distension.      Palpations: Abdomen is soft. There is no mass.      Tenderness: There is no abdominal tenderness. There is right CVA tenderness. There is no left CVA tenderness, guarding or rebound.      Hernia: No hernia is present.   Musculoskeletal:         General: No deformity. Normal range of motion.      Cervical back: Normal range of motion and neck supple.   Skin:     General: Skin is warm and dry.   Neurological:      Mental Status: She is alert and oriented to person, place, and time.   Psychiatric:         Behavior: Behavior normal.         Thought Content: Thought content normal.         Judgment: Judgment normal.       Belinda South MD

## 2024-03-03 LAB — BACTERIA UR CULT: NORMAL

## 2024-03-20 ENCOUNTER — TELEPHONE (OUTPATIENT)
Age: 74
End: 2024-03-20

## 2024-03-20 NOTE — TELEPHONE ENCOUNTER
Kathy of [facility] called for Mammogram order to be faxed to [phone]. FAXED

## 2024-03-23 ENCOUNTER — HOSPITAL ENCOUNTER (OUTPATIENT)
Dept: RADIOLOGY | Facility: HOSPITAL | Age: 74
Discharge: HOME | End: 2024-03-23
Attending: FAMILY MEDICINE
Payer: COMMERCIAL

## 2024-03-23 DIAGNOSIS — R10.9 RIGHT FLANK PAIN: ICD-10-CM

## 2024-03-23 PROCEDURE — 74176 CT ABD & PELVIS W/O CONTRAST: CPT

## 2024-03-26 DIAGNOSIS — Z12.31 BREAST CANCER SCREENING BY MAMMOGRAM: ICD-10-CM

## 2024-03-28 ENCOUNTER — OFFICE VISIT (OUTPATIENT)
Dept: FAMILY MEDICINE CLINIC | Facility: CLINIC | Age: 74
End: 2024-03-28
Payer: COMMERCIAL

## 2024-03-28 VITALS
BODY MASS INDEX: 25.79 KG/M2 | RESPIRATION RATE: 16 BRPM | WEIGHT: 136.6 LBS | HEART RATE: 68 BPM | TEMPERATURE: 99 F | HEIGHT: 61 IN | SYSTOLIC BLOOD PRESSURE: 102 MMHG | DIASTOLIC BLOOD PRESSURE: 68 MMHG

## 2024-03-28 DIAGNOSIS — R35.0 URINARY FREQUENCY: Primary | ICD-10-CM

## 2024-03-28 LAB
SL AMB  POCT GLUCOSE, UA: NEGATIVE
SL AMB LEUKOCYTE ESTERASE,UA: NORMAL
SL AMB POCT BILIRUBIN,UA: NEGATIVE
SL AMB POCT BLOOD,UA: NEGATIVE
SL AMB POCT CLARITY,UA: CLEAR
SL AMB POCT COLOR,UA: YELLOW
SL AMB POCT KETONES,UA: NEGATIVE
SL AMB POCT NITRITE,UA: NORMAL
SL AMB POCT PH,UA: 7.5
SL AMB POCT SPECIFIC GRAVITY,UA: 1.02
SL AMB POCT URINE PROTEIN: NEGATIVE
SL AMB POCT UROBILINOGEN: 0.2

## 2024-03-28 PROCEDURE — 81002 URINALYSIS NONAUTO W/O SCOPE: CPT | Performed by: FAMILY MEDICINE

## 2024-03-28 PROCEDURE — 87086 URINE CULTURE/COLONY COUNT: CPT | Performed by: FAMILY MEDICINE

## 2024-03-28 PROCEDURE — G2211 COMPLEX E/M VISIT ADD ON: HCPCS | Performed by: FAMILY MEDICINE

## 2024-03-28 PROCEDURE — 99214 OFFICE O/P EST MOD 30 MIN: CPT | Performed by: FAMILY MEDICINE

## 2024-03-28 RX ORDER — RIBOFLAVIN (VITAMIN B2) 100 MG
TABLET ORAL DAILY
COMMUNITY

## 2024-03-28 RX ORDER — SULFAMETHOXAZOLE AND TRIMETHOPRIM 800; 160 MG/1; MG/1
1 TABLET ORAL 2 TIMES DAILY
Qty: 6 TABLET | Refills: 0 | Status: SHIPPED | OUTPATIENT
Start: 2024-03-28 | End: 2024-03-31

## 2024-03-28 NOTE — PROGRESS NOTES
"Name: Helga Max      : 1950      MRN: [id number]  Encounter Provider: Belinda South MD  Encounter Date: 3/28/2024   Encounter department: [facility]    Assessment & Plan     1. Urinary frequency  -     POCT urine dip  -     Urine culture  -     sulfamethoxazole-trimethoprim (BACTRIM DS) 800-160 mg per tablet; Take 1 tablet by mouth 2 (two) times a day for 3 days       She has had right flank pain for over a month- I did order a CT renal study at last visit. She had it done last week and has not been read yet.     Subjective      Urinary Tract Infection   This is a new problem. The current episode started yesterday. The problem has been unchanged. Quality: pressure. Associated symptoms include flank pain (she had a CT scan done for this- read is still pending) and frequency. Pertinent negatives include no chills, discharge, hematuria, hesitancy, nausea, possible pregnancy, sweats, urgency or vomiting. She has tried nothing for the symptoms.       Review of Systems   Constitutional:  Negative for chills.   Gastrointestinal:  Negative for nausea and vomiting.   Genitourinary:  Positive for flank pain (she had a CT scan done for this- read is still pending) and frequency. Negative for hematuria, hesitancy and urgency.       Current Outpatient Medications on File Prior to Visit   Medication Sig    Ascorbic Acid (vitamin C) 100 MG tablet Take by mouth daily    Calcium Citrate-Vitamin D (CITRACAL MAXIMUM) 315-250 MG-UNIT TABS Take by mouth    Magnesium 250 MG TABS Take by mouth in the morning    Melatonin 3 MG CAPS Per patient taking 10MG daily    Multiple Vitamin-Folic Acid TABS Take by mouth       Objective     /68   Pulse 68   Temp 99 °F (37.2 °C) (Tympanic)   Resp 16   Ht 5' 1\" (1.549 m)   Wt 62 kg (136 lb 9.6 oz)   BMI 25.81 kg/m²     Physical Exam  Constitutional:       General: She is not in acute distress.     Appearance: She is well-developed. She is not diaphoretic.   HENT:     "  Head: Normocephalic and atraumatic.   Cardiovascular:      Rate and Rhythm: Normal rate and regular rhythm.      Heart sounds: Normal heart sounds. No murmur heard.     No friction rub. No gallop.   Pulmonary:      Effort: Pulmonary effort is normal. No respiratory distress.      Breath sounds: Normal breath sounds. No wheezing or rales.   Chest:      Chest wall: No tenderness.   Abdominal:      General: Abdomen is flat. Bowel sounds are normal. There is no distension.      Palpations: Abdomen is soft. There is no mass.      Tenderness: There is no abdominal tenderness. There is no right CVA tenderness, left CVA tenderness, guarding or rebound.      Hernia: No hernia is present.   Musculoskeletal:         General: No deformity. Normal range of motion.      Cervical back: Normal range of motion and neck supple.   Skin:     General: Skin is warm and dry.   Neurological:      Mental Status: She is alert and oriented to person, place, and time.   Psychiatric:         Behavior: Behavior normal.         Thought Content: Thought content normal.         Judgment: Judgment normal.       Belinda South MD

## 2024-03-30 LAB — BACTERIA UR CULT: NORMAL

## 2024-06-03 ENCOUNTER — OFFICE VISIT (OUTPATIENT)
Dept: URGENT CARE | Facility: CLINIC | Age: 74
End: 2024-06-03
Payer: COMMERCIAL

## 2024-06-03 VITALS
RESPIRATION RATE: 14 BRPM | TEMPERATURE: 96.6 F | BODY MASS INDEX: 26.45 KG/M2 | HEART RATE: 86 BPM | OXYGEN SATURATION: 98 % | WEIGHT: 140 LBS

## 2024-06-03 DIAGNOSIS — N39.0 URINARY TRACT INFECTION WITHOUT HEMATURIA, SITE UNSPECIFIED: Primary | ICD-10-CM

## 2024-06-03 LAB
SL AMB  POCT GLUCOSE, UA: ABNORMAL
SL AMB LEUKOCYTE ESTERASE,UA: ABNORMAL
SL AMB POCT BILIRUBIN,UA: ABNORMAL
SL AMB POCT BLOOD,UA: ABNORMAL
SL AMB POCT CLARITY,UA: ABNORMAL
SL AMB POCT COLOR,UA: YELLOW
SL AMB POCT KETONES,UA: ABNORMAL
SL AMB POCT NITRITE,UA: ABNORMAL
SL AMB POCT PH,UA: 7
SL AMB POCT SPECIFIC GRAVITY,UA: 1.01
SL AMB POCT URINE PROTEIN: ABNORMAL
SL AMB POCT UROBILINOGEN: 0.2

## 2024-06-03 PROCEDURE — 87077 CULTURE AEROBIC IDENTIFY: CPT | Performed by: PHYSICIAN ASSISTANT

## 2024-06-03 PROCEDURE — 87186 SC STD MICRODIL/AGAR DIL: CPT | Performed by: PHYSICIAN ASSISTANT

## 2024-06-03 PROCEDURE — 81002 URINALYSIS NONAUTO W/O SCOPE: CPT | Performed by: PHYSICIAN ASSISTANT

## 2024-06-03 PROCEDURE — 99203 OFFICE O/P NEW LOW 30 MIN: CPT | Performed by: PHYSICIAN ASSISTANT

## 2024-06-03 PROCEDURE — 87086 URINE CULTURE/COLONY COUNT: CPT | Performed by: PHYSICIAN ASSISTANT

## 2024-06-03 RX ORDER — CEPHALEXIN 500 MG/1
500 CAPSULE ORAL EVERY 12 HOURS SCHEDULED
Qty: 10 CAPSULE | Refills: 0 | Status: SHIPPED | COMMUNITY
Start: 2024-06-03 | End: 2024-06-08

## 2024-06-03 RX ORDER — CRANBERRY FRUIT EXTRACT 200 MG
CAPSULE ORAL
COMMUNITY
Start: 2024-04-02

## 2024-06-03 RX ORDER — CEPHALEXIN 500 MG/1
500 CAPSULE ORAL EVERY 12 HOURS SCHEDULED
Qty: 10 CAPSULE | Refills: 0 | Status: SHIPPED | OUTPATIENT
Start: 2024-06-03 | End: 2024-06-03

## 2024-06-03 NOTE — PROGRESS NOTES
[facility]        NAME: Helga Max is a 73 y.o. female  : 1950    MRN: [id number]  DATE: Lena 3, 2024  TIME: 7:38 PM    Assessment and Plan   Urinary tract infection without hematuria, site unspecified [N39.0]  1. Urinary tract infection without hematuria, site unspecified  POCT urine dip    Urine culture    cephalexin (KEFLEX) 500 mg capsule        Encouraged to drink plenty of fluids. Will send culture for sensitivities. Discussed strict return to care precautions as well as red flag symptoms which should prompt immediate ED referral. Pt verbalized understanding and is in agreement with plan.  Please follow up with your primary care provider within the next week. Please remember that your visit today was with an urgent care provider and should not replace follow up with your primary care provider for chronic medical issues or annual physicals.       Patient Instructions       Follow up with PCP in 3-5 days.  Proceed to  ER if symptoms worsen.    If tests are performed, our office will contact you with results only if changes need to made to the care plan discussed with you at the visit. You can review your full results on [facility].    Chief Complaint     Chief Complaint   Patient presents with    Possible UTI     Pt presents with lower ABD pressure, burning post urination, scant output; started yesterday         History of Present Illness       Pt is a(n) 73 y.o. female with no pertinent pmh who presents out of concern for a UTI.  Dysuria: Yes  Urgency: Yes  Frequency: Yes  Hematuria: No  Cloudy/malodorous urine: No  Vaginal discharge/itching: No  Concerns for STD: No  Fever: No  Flank pain: No  Nausea/vomiting: No  Previous UTIs: Yes  Last UTI: had symptoms 3/2024 and was treated with Bactrim but had negative culture  Feels bladder spasming after urinating        Review of Systems   Review of Systems   Constitutional:  Negative for chills, diaphoresis and fever.   Respiratory:   Negative for shortness of breath.    Cardiovascular:  Negative for chest pain.   Gastrointestinal:  Negative for abdominal pain, nausea and vomiting.   Genitourinary:  Positive for dysuria, frequency and urgency. Negative for flank pain and hematuria.   Musculoskeletal:  Negative for back pain and myalgias.   Psychiatric/Behavioral:  Negative for confusion.          Current Medications       Current Outpatient Medications:     Ascorbic Acid (vitamin C) 100 MG tablet, Take by mouth daily, Disp: , Rfl:     Calcium Citrate-Vitamin D (CITRACAL MAXIMUM) 315-250 MG-UNIT TABS, Take by mouth, Disp: , Rfl:     cephalexin (KEFLEX) 500 mg capsule, Take 1 capsule (500 mg total) by mouth every 12 (twelve) hours for 5 days, Disp: 10 capsule, Rfl: 0    Magnesium 250 MG TABS, Take by mouth in the morning, Disp: , Rfl:     Melatonin 3 MG CAPS, Per patient taking 10MG daily, Disp: , Rfl:     Multiple Vitamin-Folic Acid TABS, Take by mouth, Disp: , Rfl:     Red Yeast Rice Extract 600 MG CAPS, , Disp: , Rfl:     Current Allergies     Allergies as of 2024    (No Known Allergies)            The following portions of the patient's history were reviewed and updated as appropriate: allergies, current medications, past family history, past medical history, past social history, past surgical history and problem list.     Past Medical History:   Diagnosis Date    Arthritis     Colon polyp     Concussion     Last Assessed: 2013    GERD (gastroesophageal reflux disease)     Nonallopathic lesion of upper extremities 2008    Other bursitis of hip, unspecified hip 2008    Wears glasses        Past Surgical History:   Procedure Laterality Date    APPENDECTOMY       SECTION      COLONOSCOPY      DILATION AND CURETTAGE OF UTERUS      EGD      INCISION TENDON SHEATH HAND      Finger    TONSILLECTOMY      TUBAL LIGATION         Family History   Problem Relation Age of Onset    Fibromyalgia Mother     Arthritis Mother      Hypertension Mother     Pancreatic cancer Mother     Liver cancer Mother     Lung cancer Mother     Cancer Maternal Aunt     Breast cancer Maternal Aunt 50    Cancer Maternal Uncle     Prostate cancer Maternal Uncle     Heart disease Maternal Grandmother     Colon cancer Maternal Grandfather     Heart disease Maternal Uncle     Diverticulitis Half-Brother          Medications have been verified.        Objective   Pulse 86   Temp (!) 96.6 °F (35.9 °C)   Resp 14   Wt 63.5 kg (140 lb)   SpO2 98%   BMI 26.45 kg/m²        Physical Exam     Physical Exam  Vitals and nursing note reviewed.   Constitutional:       General: She is not in acute distress.     Appearance: Normal appearance. She is not ill-appearing.   HENT:      Head: Normocephalic and atraumatic.   Cardiovascular:      Rate and Rhythm: Normal rate and regular rhythm.      Heart sounds: Normal heart sounds.   Pulmonary:      Effort: Pulmonary effort is normal. No respiratory distress.      Breath sounds: Normal breath sounds. No wheezing, rhonchi or rales.   Abdominal:      General: Abdomen is flat.      Palpations: Abdomen is soft.      Tenderness: There is no abdominal tenderness. There is no right CVA tenderness, left CVA tenderness or guarding.   Skin:     General: Skin is warm and dry.      Capillary Refill: Capillary refill takes less than 2 seconds.   Neurological:      Mental Status: She is alert and oriented to person, place, and time.   Psychiatric:         Behavior: Behavior normal.

## 2024-06-05 LAB — BACTERIA UR CULT: ABNORMAL

## 2024-08-13 ENCOUNTER — OFFICE VISIT (OUTPATIENT)
Dept: URGENT CARE | Facility: CLINIC | Age: 74
End: 2024-08-13
Payer: COMMERCIAL

## 2024-08-13 VITALS
RESPIRATION RATE: 20 BRPM | SYSTOLIC BLOOD PRESSURE: 114 MMHG | BODY MASS INDEX: 26.43 KG/M2 | DIASTOLIC BLOOD PRESSURE: 72 MMHG | WEIGHT: 140 LBS | HEART RATE: 72 BPM | OXYGEN SATURATION: 97 % | HEIGHT: 61 IN | TEMPERATURE: 97.5 F

## 2024-08-13 DIAGNOSIS — R30.0 DYSURIA: Primary | ICD-10-CM

## 2024-08-13 LAB
SL AMB  POCT GLUCOSE, UA: ABNORMAL
SL AMB LEUKOCYTE ESTERASE,UA: ABNORMAL
SL AMB POCT BILIRUBIN,UA: ABNORMAL
SL AMB POCT BLOOD,UA: ABNORMAL
SL AMB POCT CLARITY,UA: CLEAR
SL AMB POCT COLOR,UA: YELLOW
SL AMB POCT KETONES,UA: ABNORMAL
SL AMB POCT NITRITE,UA: ABNORMAL
SL AMB POCT PH,UA: 5
SL AMB POCT SPECIFIC GRAVITY,UA: 1.03
SL AMB POCT URINE PROTEIN: 30
SL AMB POCT UROBILINOGEN: 0.2

## 2024-08-13 PROCEDURE — G2211 COMPLEX E/M VISIT ADD ON: HCPCS | Performed by: FAMILY MEDICINE

## 2024-08-13 PROCEDURE — 99213 OFFICE O/P EST LOW 20 MIN: CPT | Performed by: FAMILY MEDICINE

## 2024-08-13 PROCEDURE — 81002 URINALYSIS NONAUTO W/O SCOPE: CPT | Performed by: FAMILY MEDICINE

## 2024-08-13 PROCEDURE — 87086 URINE CULTURE/COLONY COUNT: CPT | Performed by: FAMILY MEDICINE

## 2024-08-13 RX ORDER — SULFAMETHOXAZOLE/TRIMETHOPRIM 800-160 MG
1 TABLET ORAL EVERY 12 HOURS SCHEDULED
Qty: 6 TABLET | Refills: 0 | Status: SHIPPED | OUTPATIENT
Start: 2024-08-13 | End: 2024-08-16

## 2024-08-13 NOTE — PROGRESS NOTES
[facility]        NAME: Helga Max is a 73 y.o. female  : 1950    MRN: [id number]  DATE: 2024  TIME: 11:27 AM    Assessment and Plan   Dysuria [R30.0]  1. Dysuria  POCT urine dip    Urine culture    sulfamethoxazole-trimethoprim (BACTRIM DS) 800-160 mg per tablet            Patient Instructions     Urine Dip completed today showing large leuks. Will be sent for culture. Based on prior cultures, antibiotics given today.     Follow-up with PCP in the next 1-2 days for re-evaluation if symptoms continue or worsen  Go to the ED if any fevers, malaise, flank pain, new or worsening symptoms or other concerning symptoms.     Chief Complaint     Chief Complaint   Patient presents with   • Possible UTI     Pt here for concerns of a Uti which started this morning  s/s  pressure, and freq.  No meds used.          History of Present Illness     Helga Max is a 73 y.o. female presenting to the office today for dysuria. Symptoms have been present for 1 days, and include lower abdominal pressure. She states this is the only symptom she ever gets. She takes cranberry supplements with no improvement.    Review of Systems     Review of Systems   Constitutional:  Negative for chills and fever.   HENT:  Negative for congestion, postnasal drip and sore throat.    Respiratory:  Negative for cough, shortness of breath and wheezing.    Cardiovascular:  Negative for chest pain.   Gastrointestinal:  Positive for abdominal pain (suprapubic pain). Negative for abdominal distention, nausea and vomiting.   Genitourinary:  Negative for dysuria, flank pain, frequency, hematuria and urgency.   Musculoskeletal:  Negative for back pain.   Neurological:  Negative for dizziness, syncope, light-headedness and headaches.   Psychiatric/Behavioral:  Negative for agitation and confusion.    All other systems reviewed and are negative.      Current Medications       Current Outpatient Medications:   •  Ascorbic Acid (vitamin C)  100 MG tablet, Take by mouth daily, Disp: , Rfl:   •  Calcium Citrate-Vitamin D (CITRACAL MAXIMUM) 315-250 MG-UNIT TABS, Take by mouth, Disp: , Rfl:   •  CRANBERRY PO, Take by mouth, Disp: , Rfl:   •  Magnesium 250 MG TABS, Take by mouth in the morning, Disp: , Rfl:   •  Melatonin 3 MG CAPS, Per patient taking 10MG daily, Disp: , Rfl:   •  Multiple Vitamin-Folic Acid TABS, Take by mouth, Disp: , Rfl:   •  Red Yeast Rice Extract 600 MG CAPS, , Disp: , Rfl:   •  sulfamethoxazole-trimethoprim (BACTRIM DS) 800-160 mg per tablet, Take 1 tablet by mouth every 12 (twelve) hours for 3 days, Disp: 6 tablet, Rfl: 0    Current Allergies     Allergies as of 2024   • (No Known Allergies)            The following portions of the patient's history were reviewed and updated as appropriate: allergies, current medications, past family history, past medical history, past social history, past surgical history and problem list.     Past Medical History:   Diagnosis Date   • Arthritis    • Colon polyp    • Concussion     Last Assessed: 2013   • GERD (gastroesophageal reflux disease)    • Nonallopathic lesion of upper extremities 2008   • Other bursitis of hip, unspecified hip 2008   • Wears glasses        Past Surgical History:   Procedure Laterality Date   • APPENDECTOMY     •  SECTION     • COLONOSCOPY     • DILATION AND CURETTAGE OF UTERUS     • EGD     • INCISION TENDON SHEATH HAND      Finger   • TONSILLECTOMY     • TUBAL LIGATION         Family History   Problem Relation Age of Onset   • Fibromyalgia Mother    • Arthritis Mother    • Hypertension Mother    • Pancreatic cancer Mother    • Liver cancer Mother    • Lung cancer Mother    • Cancer Maternal Aunt    • Breast cancer Maternal Aunt 50   • Cancer Maternal Uncle    • Prostate cancer Maternal Uncle    • Heart disease Maternal Grandmother    • Colon cancer Maternal Grandfather    • Heart disease Maternal Uncle    • Diverticulitis Half-Brother   "      Medications have been verified.    Objective     /72   Pulse 72   Temp 97.5 °F (36.4 °C)   Resp 20   Ht 5' 1\" (1.549 m)   Wt 63.5 kg (140 lb)   SpO2 97%   BMI 26.45 kg/m²   No LMP recorded. Patient is postmenopausal.     Physical Exam     Physical Exam  Vitals reviewed.   Constitutional:       General: She is not in acute distress.     Appearance: Normal appearance. She is not ill-appearing.   HENT:      Head: Normocephalic and atraumatic.   Eyes:      Extraocular Movements: Extraocular movements intact.      Conjunctiva/sclera: Conjunctivae normal.   Cardiovascular:      Rate and Rhythm: Normal rate and regular rhythm.      Pulses: Normal pulses.      Heart sounds: Normal heart sounds. No murmur heard.  Pulmonary:      Effort: Pulmonary effort is normal. No respiratory distress.      Breath sounds: Normal breath sounds.   Abdominal:      General: Bowel sounds are normal.      Tenderness: There is no abdominal tenderness (suprapubic).   Musculoskeletal:      Cervical back: Normal range of motion.   Skin:     General: Skin is warm.   Neurological:      General: No focal deficit present.      Mental Status: She is alert.   Psychiatric:         Mood and Affect: Mood normal.         Behavior: Behavior normal.         Judgment: Judgment normal.                   "

## 2024-08-15 LAB — BACTERIA UR CULT: NORMAL

## 2024-08-20 ENCOUNTER — TELEPHONE (OUTPATIENT)
Age: 74
End: 2024-08-20

## 2024-08-20 ENCOUNTER — TELEPHONE (OUTPATIENT)
Dept: URGENT CARE | Facility: CLINIC | Age: 74
End: 2024-08-20

## 2024-08-20 NOTE — TELEPHONE ENCOUNTER
Patient called the office requesting clarification on her urine culture results. Culture shows growth of mixed contaminants, otherwise negative. Patient is asymptomatic and states she never started treatment w/ the Bactrim. I have informed patient that because the urine cx is negative and she is not experiencing any UTI like symptoms, she would not need to treat with Bactrim at this time. I have advised the patient to follow up w/ her PCP office for re-check and repeat urine testing if she develops any symptoms to determine further steps and treatment options at that time. Patient verbalizes understanding and agrees w/ the plan.

## 2024-08-20 NOTE — TELEPHONE ENCOUNTER
Patient is requesting a call back from the office to schedule a fu from urgent care  8/13/24 for UTI symptoms which came back negative and needs a follow up.  No available appointments     She will need Thursday after 330pm    Please advise

## 2024-09-19 ENCOUNTER — TELEPHONE (OUTPATIENT)
Age: 74
End: 2024-09-19

## 2024-09-19 NOTE — TELEPHONE ENCOUNTER
Patient called stating she has been experiencing ongoing back pain for a few months. She is asking for an appointment after 430 any day. She did not wish to schedule at this time because she can not make an appointment before that time. Please advise when she can be seen.

## 2024-11-04 ENCOUNTER — OFFICE VISIT (OUTPATIENT)
Dept: URGENT CARE | Facility: CLINIC | Age: 74
End: 2024-11-04
Payer: COMMERCIAL

## 2024-11-04 VITALS
OXYGEN SATURATION: 97 % | TEMPERATURE: 96.6 F | WEIGHT: 144 LBS | HEIGHT: 61 IN | HEART RATE: 87 BPM | SYSTOLIC BLOOD PRESSURE: 130 MMHG | BODY MASS INDEX: 27.19 KG/M2 | DIASTOLIC BLOOD PRESSURE: 82 MMHG

## 2024-11-04 DIAGNOSIS — N30.00 ACUTE CYSTITIS WITHOUT HEMATURIA: Primary | ICD-10-CM

## 2024-11-04 LAB
SL AMB  POCT GLUCOSE, UA: ABNORMAL
SL AMB LEUKOCYTE ESTERASE,UA: ABNORMAL
SL AMB POCT BILIRUBIN,UA: ABNORMAL
SL AMB POCT BLOOD,UA: ABNORMAL
SL AMB POCT CLARITY,UA: ABNORMAL
SL AMB POCT COLOR,UA: YELLOW
SL AMB POCT KETONES,UA: ABNORMAL
SL AMB POCT NITRITE,UA: ABNORMAL
SL AMB POCT PH,UA: 6.5
SL AMB POCT SPECIFIC GRAVITY,UA: 1.01
SL AMB POCT URINE PROTEIN: 30
SL AMB POCT UROBILINOGEN: 0.2

## 2024-11-04 PROCEDURE — 81002 URINALYSIS NONAUTO W/O SCOPE: CPT

## 2024-11-04 PROCEDURE — 87086 URINE CULTURE/COLONY COUNT: CPT

## 2024-11-04 PROCEDURE — 99213 OFFICE O/P EST LOW 20 MIN: CPT

## 2024-11-04 PROCEDURE — 87186 SC STD MICRODIL/AGAR DIL: CPT

## 2024-11-04 PROCEDURE — 87077 CULTURE AEROBIC IDENTIFY: CPT

## 2024-11-04 RX ORDER — CEPHALEXIN 500 MG/1
500 CAPSULE ORAL EVERY 12 HOURS SCHEDULED
Qty: 10 CAPSULE | Refills: 0 | Status: SHIPPED | OUTPATIENT
Start: 2024-11-04 | End: 2024-11-09

## 2024-11-04 NOTE — PATIENT INSTRUCTIONS
Urinary Tract Infection in Women   WHAT YOU NEED TO KNOW:   A urinary tract infection (UTI) is caused by bacteria that get inside your urinary tract. Most bacteria that enter your urinary tract come out when you urinate. If the bacteria stay in your urinary tract, you may get an infection. Your urinary tract includes your kidneys, ureters, bladder, and urethra. Urine is made in your kidneys, and it flows from the ureters to the bladder. Urine leaves the bladder through the urethra. A UTI is more common in your lower urinary tract, which includes your bladder and urethra.      DISCHARGE INSTRUCTIONS:   Return to the emergency department if:   You are urinating very little or not at all.     You have a high fever with shaking chills.      You have side or back pain that gets worse.     Call your doctor if:   You have a fever.     You do not feel better after 2 days of taking antibiotics.     You are vomiting.      You have questions or concerns about your condition or care.     Medicines:   Antibiotics  help fight a bacterial infection. If you have UTIs often (called recurrent UTIs), you may be given antibiotics to take regularly. You will be given directions for when and how to use antibiotics. The goal is to prevent UTIs but not cause antibiotic resistance by using antibiotics too often.     Medicines  may be given to decrease pain and burning when you urinate. They will also help decrease the feeling that you need to urinate often. These medicines will make your urine orange or red.     Take your medicine as directed.  Contact your healthcare provider if you think your medicine is not helping or if you have side effects. Tell him or her if you are allergic to any medicine. Keep a list of the medicines, vitamins, and herbs you take. Include the amounts, and when and why you take them. Bring the list or the pill bottles to follow-up visits. Carry your medicine list with you in case of an emergency.     Follow up with  your healthcare provider as directed:  Write down your questions so you remember to ask them during your visits.   Prevent another UTI:   Empty your bladder often.  Urinate and empty your bladder as soon as you feel the need. Do not hold your urine for long periods of time.     Wipe from front to back after you urinate or have a bowel movement.  This will help prevent germs from getting into your urinary tract through your urethra.     Drink liquids as directed.  Ask how much liquid to drink each day and which liquids are best for you. You may need to drink more liquids than usual to help flush out the bacteria. Do not drink alcohol, caffeine, or citrus juices. These can irritate your bladder and increase your symptoms. Your healthcare provider may recommend cranberry juice to help prevent a UTI.     Urinate after you have sex.  This can help flush out bacteria passed during sex.     Do not douche or use feminine deodorants.  These can change the chemical balance in your vagina.     Change sanitary pads or tampons often.  This will help prevent germs from getting into your urinary tract.      Talk to your healthcare provider about your birth control method.  You may need to change your method if it is increasing your risk for UTIs.     Wear cotton underwear and clothes that are loose.  Tight pants and nylon underwear can trap moisture and cause bacteria to grow.     Vaginal estrogen may be recommended.  This medicine helps prevent UTIs in women who have gone through menopause or are in jono-menopause.     Do pelvic muscle exercises often.  Pelvic muscle exercises may help you start and stop urinating. Strong pelvic muscles may help you empty your bladder easier. Squeeze these muscles tightly for 5 seconds like you are trying to hold back urine. Then relax for 5 seconds. Gradually work up to squeezing for 10 seconds. Do 3 sets of 15 repetitions a day, or as directed.     © Copyright [employer] 2021 Information  is for End User's use only and may not be sold, redistributed or otherwise used for commercial purposes. All illustrations and images included in CareNotes® are the copyrighted property of [employer] or [employer]  The above information is an  only. It is not intended as medical advice for individual conditions or treatments. Talk to your doctor, nurse or pharmacist before following any medical regimen to see if it is safe and effective for you.

## 2024-11-04 NOTE — PROGRESS NOTES
[facility]        NAME: Helga Max is a 73 y.o. female  : 1950    MRN: [id number]  DATE: 2024  TIME: 4:50 PM    Assessment and Plan   Acute cystitis without hematuria [N30.00]  1. Acute cystitis without hematuria  Urine culture    POCT urine dip    cephalexin (KEFLEX) 500 mg capsule        Urine dip with moderate leukocytes, will treat and send for culture.     Patient Instructions     Urinary Tract Infection in Women   WHAT YOU NEED TO KNOW:   A urinary tract infection (UTI) is caused by bacteria that get inside your urinary tract. Most bacteria that enter your urinary tract come out when you urinate. If the bacteria stay in your urinary tract, you may get an infection. Your urinary tract includes your kidneys, ureters, bladder, and urethra. Urine is made in your kidneys, and it flows from the ureters to the bladder. Urine leaves the bladder through the urethra. A UTI is more common in your lower urinary tract, which includes your bladder and urethra.      DISCHARGE INSTRUCTIONS:   Return to the emergency department if:   You are urinating very little or not at all.     You have a high fever with shaking chills.      You have side or back pain that gets worse.     Call your doctor if:   You have a fever.     You do not feel better after 2 days of taking antibiotics.     You are vomiting.      You have questions or concerns about your condition or care.     Medicines:   Antibiotics  help fight a bacterial infection. If you have UTIs often (called recurrent UTIs), you may be given antibiotics to take regularly. You will be given directions for when and how to use antibiotics. The goal is to prevent UTIs but not cause antibiotic resistance by using antibiotics too often.     Medicines  may be given to decrease pain and burning when you urinate. They will also help decrease the feeling that you need to urinate often. These medicines will make your urine orange or red.     Take your  medicine as directed.  Contact your healthcare provider if you think your medicine is not helping or if you have side effects. Tell him or her if you are allergic to any medicine. Keep a list of the medicines, vitamins, and herbs you take. Include the amounts, and when and why you take them. Bring the list or the pill bottles to follow-up visits. Carry your medicine list with you in case of an emergency.     Follow up with your healthcare provider as directed:  Write down your questions so you remember to ask them during your visits.   Prevent another UTI:   Empty your bladder often.  Urinate and empty your bladder as soon as you feel the need. Do not hold your urine for long periods of time.     Wipe from front to back after you urinate or have a bowel movement.  This will help prevent germs from getting into your urinary tract through your urethra.     Drink liquids as directed.  Ask how much liquid to drink each day and which liquids are best for you. You may need to drink more liquids than usual to help flush out the bacteria. Do not drink alcohol, caffeine, or citrus juices. These can irritate your bladder and increase your symptoms. Your healthcare provider may recommend cranberry juice to help prevent a UTI.     Urinate after you have sex.  This can help flush out bacteria passed during sex.     Do not douche or use feminine deodorants.  These can change the chemical balance in your vagina.     Change sanitary pads or tampons often.  This will help prevent germs from getting into your urinary tract.      Talk to your healthcare provider about your birth control method.  You may need to change your method if it is increasing your risk for UTIs.     Wear cotton underwear and clothes that are loose.  Tight pants and nylon underwear can trap moisture and cause bacteria to grow.     Vaginal estrogen may be recommended.  This medicine helps prevent UTIs in women who have gone through menopause or are in  jono-menopause.     Do pelvic muscle exercises often.  Pelvic muscle exercises may help you start and stop urinating. Strong pelvic muscles may help you empty your bladder easier. Squeeze these muscles tightly for 5 seconds like you are trying to hold back urine. Then relax for 5 seconds. Gradually work up to squeezing for 10 seconds. Do 3 sets of 15 repetitions a day, or as directed.     © Copyright [employer] 2021 Information is for End User's use only and may not be sold, redistributed or otherwise used for commercial purposes. All illustrations and images included in CareNotes® are the copyrighted property of [employer] or [employer]  The above information is an  only. It is not intended as medical advice for individual conditions or treatments. Talk to your doctor, nurse or pharmacist before following any medical regimen to see if it is safe and effective for you.    Follow up with PCP in 3-5 days.  Proceed to  ER if symptoms worsen.    If tests are performed, our office will contact you with results only if changes need to made to the care plan discussed with you at the visit. You can review your full results on [facility] Mychart.    Chief Complaint     Chief Complaint   Patient presents with    Urinary Tract Infection     Started feeling symptoms before bed and has pressure lower abdomen, only urinates a little and has pushing feeling- and going frequently.          History of Present Illness       Urinary Tract Infection   This is a new problem. The current episode started yesterday. The problem occurs every urination. The problem has been unchanged. The quality of the pain is described as burning. The pain is moderate. There has been no fever. There is No history of pyelonephritis. Associated symptoms include frequency and urgency. Pertinent negatives include no chills, nausea or vomiting. She has tried nothing for the symptoms. Her past medical history is significant for  recurrent UTIs.       Review of Systems   Review of Systems   Constitutional:  Negative for chills and fever.   HENT:  Negative for congestion, postnasal drip, rhinorrhea, sinus pressure, sore throat and trouble swallowing.    Respiratory:  Negative for cough, chest tightness and shortness of breath.    Cardiovascular:  Negative for chest pain and palpitations.   Gastrointestinal:  Negative for abdominal pain, nausea and vomiting.   Genitourinary:  Positive for dysuria, frequency, pelvic pain (pressure) and urgency. Negative for difficulty urinating.   Musculoskeletal:  Negative for myalgias.   Neurological:  Negative for dizziness and headaches.         Current Medications       Current Outpatient Medications:     Calcium Citrate-Vitamin D (CITRACAL MAXIMUM) 315-250 MG-UNIT TABS, Take by mouth, Disp: , Rfl:     cephalexin (KEFLEX) 500 mg capsule, Take 1 capsule (500 mg total) by mouth every 12 (twelve) hours for 5 days, Disp: 10 capsule, Rfl: 0    CRANBERRY PO, Take by mouth, Disp: , Rfl:     Magnesium 250 MG TABS, Take by mouth in the morning, Disp: , Rfl:     Melatonin 3 MG CAPS, Per patient taking 10MG daily, Disp: , Rfl:     Multiple Vitamin-Folic Acid TABS, Take by mouth, Disp: , Rfl:     Red Yeast Rice Extract 600 MG CAPS, , Disp: , Rfl:     Ascorbic Acid (vitamin C) 100 MG tablet, Take by mouth daily (Patient not taking: Reported on 11/4/2024), Disp: , Rfl:     Current Allergies     Allergies as of 11/04/2024    (No Known Allergies)            The following portions of the patient's history were reviewed and updated as appropriate: allergies, current medications, past family history, past medical history, past social history, past surgical history and problem list.     Past Medical History:   Diagnosis Date    Arthritis     Colon polyp     Concussion     Last Assessed: 12/26/2013    GERD (gastroesophageal reflux disease)     Nonallopathic lesion of upper extremities 07/03/2008    Other bursitis of hip,  "unspecified hip 2008    Wears glasses        Past Surgical History:   Procedure Laterality Date    APPENDECTOMY       SECTION      COLONOSCOPY      DILATION AND CURETTAGE OF UTERUS      EGD      INCISION TENDON SHEATH HAND      Finger    TONSILLECTOMY      TUBAL LIGATION         Family History   Problem Relation Age of Onset    Fibromyalgia Mother     Arthritis Mother     Hypertension Mother     Pancreatic cancer Mother     Liver cancer Mother     Lung cancer Mother     Cancer Maternal Aunt     Breast cancer Maternal Aunt 50    Cancer Maternal Uncle     Prostate cancer Maternal Uncle     Heart disease Maternal Grandmother     Colon cancer Maternal Grandfather     Heart disease Maternal Uncle     Diverticulitis Half-Brother          Medications have been verified.        Objective   /82 (BP Location: Left arm, Patient Position: Sitting, Cuff Size: Standard)   Pulse 87   Temp (!) 96.6 °F (35.9 °C)   Ht 5' 1\" (1.549 m)   Wt 65.3 kg (144 lb)   SpO2 97%   BMI 27.21 kg/m²        Physical Exam     Physical Exam  Constitutional:       General: She is not in acute distress.  HENT:      Head: Normocephalic.      Nose: Nose normal.   Eyes:      Pupils: Pupils are equal, round, and reactive to light.   Cardiovascular:      Rate and Rhythm: Normal rate and regular rhythm.      Pulses: Normal pulses.      Heart sounds: Normal heart sounds.   Pulmonary:      Effort: Pulmonary effort is normal.      Breath sounds: Normal breath sounds.   Abdominal:      General: Abdomen is flat. There is no distension.      Tenderness: There is no abdominal tenderness. There is no right CVA tenderness, left CVA tenderness or guarding.   Musculoskeletal:         General: Normal range of motion.   Skin:     General: Skin is warm and dry.      Capillary Refill: Capillary refill takes less than 2 seconds.   Neurological:      Mental Status: She is alert and oriented to person, place, and time.                   "

## 2024-11-06 LAB — BACTERIA UR CULT: ABNORMAL

## 2025-02-17 ENCOUNTER — OFFICE VISIT (OUTPATIENT)
Dept: FAMILY MEDICINE CLINIC | Facility: CLINIC | Age: 75
End: 2025-02-17
Payer: COMMERCIAL

## 2025-02-17 VITALS
HEART RATE: 68 BPM | HEIGHT: 61 IN | TEMPERATURE: 97.5 F | RESPIRATION RATE: 17 BRPM | WEIGHT: 143 LBS | DIASTOLIC BLOOD PRESSURE: 78 MMHG | SYSTOLIC BLOOD PRESSURE: 136 MMHG | BODY MASS INDEX: 27 KG/M2

## 2025-02-17 DIAGNOSIS — R10.9 FLANK PAIN: Primary | ICD-10-CM

## 2025-02-17 LAB
SL AMB  POCT GLUCOSE, UA: NEGATIVE
SL AMB LEUKOCYTE ESTERASE,UA: NORMAL
SL AMB POCT BILIRUBIN,UA: NEGATIVE
SL AMB POCT BLOOD,UA: NEGATIVE
SL AMB POCT CLARITY,UA: CLEAR
SL AMB POCT COLOR,UA: NORMAL
SL AMB POCT KETONES,UA: NEGATIVE
SL AMB POCT NITRITE,UA: NEGATIVE
SL AMB POCT PH,UA: 7.5
SL AMB POCT SPECIFIC GRAVITY,UA: >=1.03
SL AMB POCT URINE PROTEIN: NEGATIVE
SL AMB POCT UROBILINOGEN: NORMAL

## 2025-02-17 PROCEDURE — 81003 URINALYSIS AUTO W/O SCOPE: CPT | Performed by: FAMILY MEDICINE

## 2025-02-17 PROCEDURE — G2211 COMPLEX E/M VISIT ADD ON: HCPCS | Performed by: FAMILY MEDICINE

## 2025-02-17 PROCEDURE — 99214 OFFICE O/P EST MOD 30 MIN: CPT | Performed by: FAMILY MEDICINE

## 2025-02-17 NOTE — ASSESSMENT & PLAN NOTE
[facility]  UA neg  Send urine for c/s, await to tx if positive  PT suggested  Orders:    Urine culture    Ambulatory referral to Physical Therapy; Future    POCT urine dip auto non-scope

## 2025-02-17 NOTE — PROGRESS NOTES
Name: Helga Max      : 1950      MRN: [id number]  Encounter Provider: Vargas Carnes DO  Encounter Date: 2025   Encounter department: [facility]  :  Assessment & Plan  Flank pain  Suspect MSK  UA neg  Send urine for c/s, await to tx if positive  PT suggested  Orders:    Urine culture    Ambulatory referral to Physical Therapy; Future    POCT urine dip auto non-scope       History of Present Illness   HPI  10 months?  Right flank pain  On/off  Severe at times  Can last hours  Can be 2x/week  No physical triggers  Yellow to clear urine color  No hx of kidney stones  Bowels ok  No urine frequency noted  No fever  CT ap w/o cx on 3/23/24 w/o abnromality  Review of Systems   Constitutional:  Negative for fever and unexpected weight change.   Gastrointestinal:  Negative for constipation and diarrhea.   Genitourinary:  Negative for difficulty urinating.     Family History   Problem Relation Age of Onset    Fibromyalgia Mother     Arthritis Mother     Hypertension Mother     Pancreatic cancer Mother     Liver cancer Mother     Lung cancer Mother     Cancer Maternal Aunt     Breast cancer Maternal Aunt 50    Cancer Maternal Uncle     Prostate cancer Maternal Uncle     Heart disease Maternal Grandmother     Colon cancer Maternal Grandfather     Heart disease Maternal Uncle     Diverticulitis Half-Brother       Social History     Tobacco Use    Smoking status: Never     Passive exposure: Never    Smokeless tobacco: Never   Vaping Use    Vaping status: Never Used   Substance Use Topics    Alcohol use: Not Currently     Comment: rare    Drug use: No      Current Outpatient Medications   Medication Instructions    Calcium Citrate-Vitamin D (CITRACAL MAXIMUM) 315-250 MG-UNIT TABS Take by mouth    CRANBERRY PO Take by mouth    Magnesium 250 MG TABS Daily    Melatonin 3 MG CAPS Per patient taking 10MG daily    Multiple Vitamin-Folic Acid TABS Take by mouth    Red Yeast Rice Extract 600 MG CAPS   "    >>>>>>>>  Return in about 3 months (around 5/17/2025) for medicare wellness visit.  >>>>>>>>    <POCT>  Recent Results (from the past 24 hours)   POCT urine dip auto non-scope    Collection Time: 02/17/25 12:52 PM   Result Value Ref Range     COLOR,UA light yellow     CLARITY,UA clear     SPECIFIC GRAVITY,UA >=1.030      PH,UA 7.5     LEUKOCYTE ESTERASE,UA trace     NITRITE,UA negative     GLUCOSE, UA negative     KETONES,UA negative     BILIRUBIN,UA negative     BLOOD,UA negative     POCT URINE PROTEIN negative     SL AMB POCT UROBILINOGEN 0.2 E.U. /dL        Visit Vitals  /78   Pulse 68   Temp 97.5 °F (36.4 °C)   Resp 17   Ht 5' 1\" (1.549 m)   Wt 64.9 kg (143 lb)   BMI 27.02 kg/m²   OB Status Postmenopausal   Smoking Status Never   BSA 1.64 m²        Objective   /78   Pulse 68   Temp 97.5 °F (36.4 °C)   Resp 17   Ht 5' 1\" (1.549 m)   Wt 64.9 kg (143 lb)   BMI 27.02 kg/m²      Physical Exam  Vitals and nursing note reviewed.   Constitutional:       Appearance: Normal appearance. She is well-developed.   HENT:      Head: Normocephalic.      Right Ear: Tympanic membrane normal.      Left Ear: Tympanic membrane normal.      Mouth/Throat:      Mouth: Mucous membranes are moist.   Eyes:      General: No scleral icterus.     Conjunctiva/sclera: Conjunctivae normal.   Cardiovascular:      Rate and Rhythm: Normal rate and regular rhythm.      Heart sounds: No murmur heard.  Pulmonary:      Effort: Pulmonary effort is normal. No respiratory distress.      Breath sounds: No wheezing.   Abdominal:      General: There is no distension.      Palpations: Abdomen is soft.      Tenderness: There is no abdominal tenderness. There is no right CVA tenderness, left CVA tenderness or rebound.   Musculoskeletal:         General: No deformity.      Cervical back: Neck supple.      Right lower leg: No edema.      Left lower leg: No edema.   Skin:     General: Skin is warm and dry.      Coloration: Skin is not " jaundiced or pale.      Findings: No rash.   Neurological:      Mental Status: She is alert.      Motor: No weakness.      Gait: Gait normal.   Psychiatric:         Mood and Affect: Mood normal.         Behavior: Behavior normal.         Thought Content: Thought content normal.

## 2025-03-05 ENCOUNTER — RA CDI HCC (OUTPATIENT)
Dept: OTHER | Facility: HOSPITAL | Age: 75
End: 2025-03-05

## 2025-04-14 ENCOUNTER — DOCUMENTATION (OUTPATIENT)
Dept: ADMINISTRATIVE | Facility: OTHER | Age: 75
End: 2025-04-14

## 2025-04-14 NOTE — PROGRESS NOTES
04/14/25 2:57 PM    Annual Wellness Visit outreach is not required, patient seen in last 3 months.     Thank you.  Francisco Gaona MA  [facility]

## 2025-06-20 ENCOUNTER — HOSPITAL ENCOUNTER (EMERGENCY)
Facility: HOSPITAL | Age: 75
Discharge: HOME | End: 2025-06-21
Attending: EMERGENCY MEDICINE
Payer: COMMERCIAL

## 2025-06-20 ENCOUNTER — APPOINTMENT (EMERGENCY)
Dept: CT IMAGING | Facility: HOSPITAL | Age: 75
End: 2025-06-20
Payer: COMMERCIAL

## 2025-06-20 VITALS
HEIGHT: 61 IN | TEMPERATURE: 97.9 F | DIASTOLIC BLOOD PRESSURE: 73 MMHG | SYSTOLIC BLOOD PRESSURE: 146 MMHG | OXYGEN SATURATION: 93 % | BODY MASS INDEX: 29.93 KG/M2 | RESPIRATION RATE: 18 BRPM | HEART RATE: 65 BPM | WEIGHT: 158.51 LBS

## 2025-06-20 DIAGNOSIS — S00.03XA CONTUSION OF SCALP, INITIAL ENCOUNTER: Primary | ICD-10-CM
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
## 2025-06-20 DIAGNOSIS — S09.90XA CLOSED HEAD INJURY, INITIAL ENCOUNTER: ICD-10-CM

## 2025-06-20 PROCEDURE — 99284 EMERGENCY DEPT VISIT MOD MDM: CPT

## 2025-06-20 PROCEDURE — 72125 CT NECK SPINE W/O DYE: CPT

## 2025-06-20 PROCEDURE — 93005 ELECTROCARDIOGRAM TRACING: CPT

## 2025-06-20 PROCEDURE — 99285 EMERGENCY DEPT VISIT HI MDM: CPT | Performed by: EMERGENCY MEDICINE

## 2025-06-20 PROCEDURE — 70450 CT HEAD/BRAIN W/O DYE: CPT

## 2025-06-20 RX ORDER — ACETAMINOPHEN 325 MG/1
975 TABLET ORAL ONCE
Status: COMPLETED | OUTPATIENT
Start: 2025-06-20 | End: 2025-06-20

## 2025-06-20 RX ADMIN — ACETAMINOPHEN 975 MG: 325 TABLET ORAL at 22:58

## 2025-06-21 NOTE — ED ATTENDING ATTESTATION
6/20/2025  I, Sim Sears MD, saw and evaluated the patient. I have discussed the patient with the resident/non-physician practitioner and agree with the resident's/non-physician practitioner's findings, Plan of Care, and MDM as documented in the resident's/non-physician practitioner's note, except where noted. All available labs and Radiology studies were reviewed.  I was present for key portions of any procedure(s) performed by the resident/non-physician practitioner and I was immediately available to provide assistance.       At this point I agree with the current assessment done in the Emergency Department.  I have conducted an independent evaluation of this patient a history and physical is as follows:  Briefly, 74-year-old female presenting with headache status post fall.  Patient states that she was in a parking lot, was down to put something in the back of her car, tripped over a parking sign and fell backwards, striking her head.  She denies loss of consciousness but does complain of pain to the head which is dull, moderate intensity, radiating down the head, worse with pressing the area and better at rest.  She denies neck pain, chest pain, shortness of breath, numbness, weakness, change speech or vision, loss consciousness, other symptoms.  On examination, small contusion to the right parietal area, no deformity, no crepitus.  Cranial nerves II through XII intact, 5 out of 5 strength in all extremities, normal speech and coordination.  Heart sounds normal, lungs clear to auscultation, no other evidence of trauma noted.  Focused trauma imaging reassuring without evidence of skull fracture, intracranial hemorrhage, unstable cervical spine fracture, other significant traumatic injury.  Discharged with strict return precautions, follow-up primary care doctor.  ED Course         Critical Care Time  Procedures

## 2025-06-21 NOTE — DISCHARGE INSTRUCTIONS
Please follow up with your primary care provider concerning your visit today within the next 1-3 days.  Please return to the Emergency Department for any headache that does not improve with Tylenol or Motrin, dizziness, weakness, numbness, vomiting ,other concerns or worsening symptoms.

## 2025-06-21 NOTE — ED NOTES
Passed PO challenge. Able to eat crackers and drink with ease.     January Lamas RN  06/21/25 0008       January Lamas RN  06/21/25 0009

## 2025-06-21 NOTE — ED PROVIDER NOTES
Time reflects when diagnosis was documented in both MDM as applicable and the Disposition within this note       Time User Action Codes Description Comment    6/20/2025 11:32 PM Bari Jaramillo Add [S00.03XA] Contusion of scalp, initial encounter     6/20/2025 11:32 PM Bari Jaramillo Add [S09.90XA] Closed head injury, initial encounter     6/20/2025 11:54 PM Bari Jaramillo Add [W19.XXXA] Fall, initial encounter           ED Disposition       ED Disposition   Discharge    Condition   Stable    Date/Time   Fri Jun 20, 2025 11:54 PM    Comment   Helga Max discharge to home/self care.                   Assessment & Plan       Medical Decision Making  74-year-old female presents status post mechanical fall with head strike.  Patient seen and examined, differential diagnosis includes but is not limited to scalp hematoma, contusion, TBI, skull fracture, cervical injury, ICH.  Obtained CT of the head and neck which showed no acute traumatic injury or other acute pathology.  EKG as interpreted by myself as below secondary due to patient's lightheadedness, no evidence of arrhythmia, or ST elevations.  Patient was able to tolerate p.o. while in the ED.  Strict return precautions provided. Patient appears well, nontoxic, agrees with plan of care at this time.  Answered all questions.  In light of this, patient would benefit from outpatient follow-up.    Amount and/or Complexity of Data Reviewed  Radiology: ordered.    Risk  OTC drugs.             Medications   acetaminophen (TYLENOL) tablet 975 mg (975 mg Oral Given 6/20/25 9488)       ED Risk Strat Scores                    No data recorded                            History of Present Illness       Chief Complaint   Patient presents with    Fall     Patient arrived via EMS from [facility]. Patient fell back over a sign. +HS. -LOC. -HA/dizziness. Denies thinners.       Past Medical History[1]   Past Surgical History[2]   Family History[3]   Social History[4]    E-Cigarette/Vaping    E-Cigarette Use Never User       E-Cigarette/Vaping Substances    Nicotine No     THC No     CBD No     Flavoring No     Other No     Unknown No       I have reviewed and agree with the history as documented.     (Helga Mxa) Helga Max is a 74 y.o. female     They presented to the emergency department on June 20, 2025. Patient presents with:  Fall: Patient arrived via EMS from [facility]. Patient fell back over a sign. +HS. -LOC. -HA/dizziness. Denies thinners.  .    The patient states that she was at [facility], tripped falling backwards over a sign states that she hit the back of her head, however did not lose consciousness.  Patient does not have any anticoagulation or antiplatelet use.  Daughter who is a former EMT now  called 911 and patient was evaluated and was brought to the emergency department for further evaluation.  Per daughter patient felt slightly lightheaded just prior to examination however did not have any lightheadedness prior.  Patient denies preceding dizziness, lightheadedness, chest pain, difficulty breathing, abdominal pain, nausea, vomiting, change in bowel habits, change urination, or any other complaint at this time.              Review of Systems   Constitutional:  Negative for chills and fever.   HENT:  Negative for ear pain and sore throat.    Eyes:  Negative for pain and visual disturbance.   Respiratory:  Negative for cough and shortness of breath.    Cardiovascular:  Negative for chest pain and palpitations.   Gastrointestinal:  Negative for abdominal pain and vomiting.   Genitourinary:  Negative for dysuria and hematuria.   Musculoskeletal:  Negative for arthralgias and back pain.   Skin:  Negative for color change and rash.   Neurological:  Positive for light-headedness. Negative for seizures and syncope.   All other systems reviewed and are negative.          Objective       ED Triage Vitals   Temperature Pulse Blood Pressure Respirations SpO2 Patient  Position - Orthostatic VS   06/20/25 2129 06/20/25 2129 06/20/25 2129 06/20/25 2129 06/20/25 2129 06/20/25 2129   97.9 °F (36.6 °C) 97 (!) 172/84 18 96 % Lying      Temp Source Heart Rate Source BP Location FiO2 (%) Pain Score    06/20/25 2129 06/20/25 2129 06/20/25 2129 -- 06/20/25 2230    Oral Monitor Right arm  1      Vitals      Date and Time Temp Pulse SpO2 Resp BP Pain Score FACES Pain Rating User   06/20/25 2330 -- 65 93 % 18 146/73 -- --    06/20/25 2300 -- 72 96 % 18 165/79 -- --    06/20/25 2258 -- -- -- -- -- 1 --    06/20/25 2230 -- 70 97 % 18 153/77 1 --    06/20/25 2215 -- 81 98 % -- 159/84 -- --    06/20/25 2202 -- 83 97 % 18 144/80 -- --    06/20/25 2129 97.9 °F (36.6 °C) 97 96 % 18 172/84 -- --             Physical Exam  Vitals and nursing note reviewed.   Constitutional:       General: She is not in acute distress.     Appearance: Normal appearance.      Interventions: Cervical collar in place.   HENT:      Head: Normocephalic.      Comments: Small left occipital hematoma     Right Ear: External ear normal.      Left Ear: External ear normal.      Nose: Nose normal.      Mouth/Throat:      Mouth: Mucous membranes are moist.     Eyes:      Conjunctiva/sclera: Conjunctivae normal.       Cardiovascular:      Rate and Rhythm: Normal rate and regular rhythm.   Pulmonary:      Effort: Pulmonary effort is normal. No respiratory distress.      Breath sounds: Normal breath sounds.   Abdominal:      General: Abdomen is flat. Bowel sounds are normal.      Tenderness: There is no abdominal tenderness. There is no guarding or rebound.     Musculoskeletal:         General: Normal range of motion.      Cervical back: Normal range of motion.     Skin:     General: Skin is warm and dry.      Capillary Refill: Capillary refill takes less than 2 seconds.     Neurological:      Mental Status: She is alert. Mental status is at baseline.     Psychiatric:         Mood and Affect: Mood normal.          Results Reviewed       None            CT head without contrast   Final Interpretation by Blayne Rangel DO (06/20 2818)   No acute intracranial abnormality.   Small right parietal scalp contusion                  Workstation performed: [id number]         CT spine cervical without contrast   Final Interpretation by Blayne Rangel DO (06/20 6400)      No evidence of acute fracture, traumatic subluxation, or atlantooccipital dislocation.   Distended esophagus with gastroesophageal reflux of fluid               Workstation performed: [id number]             ECG 12 Lead Documentation Only    Date/Time: 6/20/2025 11:34 PM    Performed by: Bari Jaramillo MD  Authorized by: Bari Jaramillo MD    ECG reviewed by me, the ED Provider: yes    Patient location:  ED  Previous ECG:     Previous ECG:  Compared to current    Similarity:  No change  Interpretation:     Interpretation: normal    Rate:     ECG rate:  85    ECG rate assessment: normal    Rhythm:     Rhythm: sinus rhythm    Ectopy:     Ectopy: none    QRS:     QRS axis:  Normal    QRS intervals:  Normal  Conduction:     Conduction: normal    ST segments:     ST segments:  Normal  T waves:     T waves: normal    Comments:      Normal sinus rhythm at 85 bpm, no PAC, no PVC, no acute ischemic changes.      ED Medication and Procedure Management   Prior to Admission Medications   Prescriptions Last Dose Informant Patient Reported? Taking?   CRANBERRY PO   Yes No   Sig: Take by mouth   Calcium Citrate-Vitamin D (CITRACAL MAXIMUM) 315-250 MG-UNIT TABS  Self Yes No   Sig: Take by mouth   Magnesium 250 MG TABS  Self Yes No   Sig: Take by mouth in the morning   Melatonin 3 MG CAPS  Self Yes No   Sig: Per patient taking 10MG daily   Multiple Vitamin-Folic Acid TABS  Self Yes No   Sig: Take by mouth   Red Yeast Rice Extract 600 MG CAPS   Yes No      Facility-Administered Medications: None     Patient's Medications   Discharge Prescriptions    No medications on file      No discharge procedures on file.  ED SEPSIS DOCUMENTATION   Time reflects when diagnosis was documented in both MDM as applicable and the Disposition within this note       Time User Action Codes Description Comment    2025 11:32 PM Bari Jaramillo Add [S00.03XA] Contusion of scalp, initial encounter     2025 11:32 PM Bari Jaramillo Add [S09.90XA] Closed head injury, initial encounter     2025 11:54 PM Bari Jaramillo Add [W19.XXXA] Fall, initial encounter                      [1]   Past Medical History:  Diagnosis Date    Arthritis     Colon polyp     Concussion     Last Assessed: 2013    GERD (gastroesophageal reflux disease)     Nonallopathic lesion of upper extremities 2008    Other bursitis of hip, unspecified hip 2008    Wears glasses    [2]   Past Surgical History:  Procedure Laterality Date    APPENDECTOMY       SECTION      COLONOSCOPY      DILATION AND CURETTAGE OF UTERUS      EGD      INCISION TENDON SHEATH HAND      Finger    TONSILLECTOMY      TUBAL LIGATION     [3]   Family History  Problem Relation Name Age of Onset    Fibromyalgia Mother      Arthritis Mother      Hypertension Mother      Pancreatic cancer Mother      Liver cancer Mother      Lung cancer Mother      Cancer Maternal Aunt      Breast cancer Maternal Aunt  50    Cancer Maternal Uncle      Prostate cancer Maternal Uncle      Heart disease Maternal Grandmother      Colon cancer Maternal Grandfather      Heart disease Maternal Uncle      Diverticulitis Half-Brother     [4]   Social History  Tobacco Use    Smoking status: Never     Passive exposure: Never    Smokeless tobacco: Never   Vaping Use    Vaping status: Never Used   Substance Use Topics    Alcohol use: Not Currently     Comment: rare    Drug use: No        Bari Jaramillo MD  25 0019

## 2025-06-22 LAB
ATRIAL RATE: 85 BPM
P AXIS: 84 DEGREES
PR INTERVAL: 144 MS
QRS AXIS: 58 DEGREES
QRSD INTERVAL: 78 MS
QT INTERVAL: 378 MS
QTC INTERVAL: 449 MS
T WAVE AXIS: 58 DEGREES
VENTRICULAR RATE: 85 BPM

## 2025-06-22 PROCEDURE — 93010 ELECTROCARDIOGRAM REPORT: CPT | Performed by: INTERNAL MEDICINE

## 2025-06-26 ENCOUNTER — OFFICE VISIT (OUTPATIENT)
Dept: URGENT CARE | Facility: CLINIC | Age: 75
End: 2025-06-26
Payer: COMMERCIAL

## 2025-06-26 VITALS
TEMPERATURE: 97.8 F | WEIGHT: 141 LBS | DIASTOLIC BLOOD PRESSURE: 78 MMHG | BODY MASS INDEX: 26.64 KG/M2 | OXYGEN SATURATION: 99 % | HEART RATE: 74 BPM | SYSTOLIC BLOOD PRESSURE: 128 MMHG | RESPIRATION RATE: 16 BRPM

## 2025-06-26 DIAGNOSIS — R30.0 DYSURIA: Primary | ICD-10-CM

## 2025-06-26 LAB
SL AMB  POCT GLUCOSE, UA: ABNORMAL
SL AMB LEUKOCYTE ESTERASE,UA: ABNORMAL
SL AMB POCT BILIRUBIN,UA: ABNORMAL
SL AMB POCT BLOOD,UA: ABNORMAL
SL AMB POCT CLARITY,UA: ABNORMAL
SL AMB POCT COLOR,UA: ABNORMAL
SL AMB POCT KETONES,UA: ABNORMAL
SL AMB POCT NITRITE,UA: ABNORMAL
SL AMB POCT PH,UA: 8
SL AMB POCT SPECIFIC GRAVITY,UA: 1.01
SL AMB POCT URINE PROTEIN: 30
SL AMB POCT UROBILINOGEN: 0.2

## 2025-06-26 PROCEDURE — 81002 URINALYSIS NONAUTO W/O SCOPE: CPT | Performed by: PHYSICIAN ASSISTANT

## 2025-06-26 PROCEDURE — 99213 OFFICE O/P EST LOW 20 MIN: CPT | Performed by: PHYSICIAN ASSISTANT

## 2025-06-26 RX ORDER — CEPHALEXIN 500 MG/1
500 CAPSULE ORAL EVERY 12 HOURS SCHEDULED
Qty: 14 CAPSULE | Refills: 0 | Status: SHIPPED | OUTPATIENT
Start: 2025-06-26 | End: 2025-07-03

## 2025-06-26 NOTE — PROGRESS NOTES
"  [facility]        NAME: Helga Max is a 74 y.o. female  : 1950    MRN: [id number]  DATE: 2025  TIME: 10:05 AM    Assessment and Plan   Dysuria [R30.0]  1. Dysuria  POCT urine dip    Urine culture    Urine culture    cephalexin (KEFLEX) 500 mg capsule            Patient Instructions       FolloDysuria:   -The patients hx and sx are most consistent with an acute uncomplicated UTI. Will treat with Keflex taken as prescribed. Take with food and a probiotic. She has no fever, chills, body aches. No flank pain or CVA tenderness as per patient.  Natural food sources of probiotics are great like kimchi, sauerkraut, yogurt, kefir, pickles, sourdough and kombucha.   -U/A showed nitrites, blood and leukocytes and urine cx ordered today. Call in 48 hours for your results.   -We discussed Uqora supplements or D-Mannose for bladder health and prevention   -Warm heating pad on the abdomen or sitz bath for comfort  -Avoid bubble bath/bath oils. Caffeine and alcohol may irritate the bladder.   -Empty bladder immediately before and following intercourse. Avoid \"holding urine.\"  -AZO/Uricalm for pain control, do not take for more than 48 hours as this can mask worsening symptoms.   -Stay VERY well hydrated and push fluids. You want your urine clear.   -Prevention and precautions discussed  -If your sx worsen or persists, see your PCP or OBGYN immediately as discussed. Red flag signs discussed in depth.     w up with PCP in 3-5 days.  Proceed to  ER if symptoms worsen.    If tests have been performed at [facility], our office will contact you with results if changes need to be made to the care plan discussed with you at the visit.  You can review your full results on [facility] MyChart.    Chief Complaint     Chief Complaint   Patient presents with    Possible UTI     1 day hx of pressure on urination, increased frequency of small amounts of urine.          History of Present Illness       The patient presents " today for a one day hx of urinary frequency, urgency, dysuria and urinary dribbling.  She has no fever, chills, body aches. No nausea, vomiting, diarrhea. No flank pain, abdominal pain, pelvic pain.The urine does not appear discolored or have an exceptionally strong odor. No hematuria. No abnormal vaginal bleeding or discharge. No vaginal pruritis or irritation. No OTC measures attempted for her symptoms.                Review of Systems   Review of Systems   Constitutional:  Negative for activity change, appetite change, chills, fatigue and fever.   Respiratory:  Negative for cough, chest tightness, shortness of breath and wheezing.    Cardiovascular:  Negative for chest pain and palpitations.   Gastrointestinal:  Negative for abdominal distention, abdominal pain, blood in stool, constipation, diarrhea, nausea and vomiting.   Genitourinary:  Positive for dysuria, frequency and urgency. Negative for decreased urine volume, flank pain, hematuria, vaginal bleeding, vaginal discharge and vaginal pain.   Musculoskeletal:  Negative for arthralgias and myalgias.   Skin:  Negative for rash.   Hematological:  Negative for adenopathy. Does not bruise/bleed easily.         Current Medications     Current Medications[1]    Current Allergies     Allergies as of 06/26/2025    (No Known Allergies)            The following portions of the patient's history were reviewed and updated as appropriate: allergies, current medications, past family history, past medical history, past social history, past surgical history and problem list.     Past Medical History[2]    Past Surgical History[3]    Family History[4]      Medications have been verified.        Objective   /78   Pulse 74   Temp 97.8 °F (36.6 °C)   Resp 16   Wt 64 kg (141 lb)   SpO2 99%   BMI 26.64 kg/m²   No LMP recorded. Patient is postmenopausal.       Physical Exam     Physical Exam  Vitals and nursing note reviewed.   Constitutional:       General: She is not  in acute distress.     Appearance: Normal appearance. She is well-developed. She is not diaphoretic.     Cardiovascular:      Rate and Rhythm: Normal rate and regular rhythm.      Heart sounds: Normal heart sounds.   Pulmonary:      Effort: Pulmonary effort is normal.      Breath sounds: Normal breath sounds.   Abdominal:      General: Bowel sounds are normal. There is no distension.      Palpations: Abdomen is soft. Abdomen is not rigid.      Tenderness: There is no abdominal tenderness. There is no right CVA tenderness, left CVA tenderness, guarding or rebound. Negative signs include Martin's sign and McBurney's sign.      Hernia: No hernia is present.     Skin:     General: Skin is warm and dry.      Capillary Refill: Capillary refill takes less than 2 seconds.     Psychiatric:         Behavior: Behavior normal.                      [1]   Current Outpatient Medications:     cephalexin (KEFLEX) 500 mg capsule, Take 1 capsule (500 mg total) by mouth every 12 (twelve) hours for 7 days, Disp: 14 capsule, Rfl: 0    Calcium Citrate-Vitamin D (CITRACAL MAXIMUM) 315-250 MG-UNIT TABS, Take by mouth, Disp: , Rfl:     CRANBERRY PO, Take by mouth, Disp: , Rfl:     Magnesium 250 MG TABS, Take by mouth in the morning, Disp: , Rfl:     Melatonin 3 MG CAPS, Per patient taking 10MG daily, Disp: , Rfl:     Multiple Vitamin-Folic Acid TABS, Take by mouth, Disp: , Rfl:     Red Yeast Rice Extract 600 MG CAPS, , Disp: , Rfl:   [2]   Past Medical History:  Diagnosis Date    Arthritis     Colon polyp     Concussion     Last Assessed: 2013    GERD (gastroesophageal reflux disease)     Nonallopathic lesion of upper extremities 2008    Other bursitis of hip, unspecified hip 2008    Wears glasses    [3]   Past Surgical History:  Procedure Laterality Date    APPENDECTOMY       SECTION      COLONOSCOPY      DILATION AND CURETTAGE OF UTERUS      EGD      INCISION TENDON SHEATH HAND      Finger    TONSILLECTOMY       TUBAL LIGATION     [4]   Family History  Problem Relation Name Age of Onset    Fibromyalgia Mother      Arthritis Mother      Hypertension Mother      Pancreatic cancer Mother      Liver cancer Mother      Lung cancer Mother      Cancer Maternal Aunt      Breast cancer Maternal Aunt  50    Cancer Maternal Uncle      Prostate cancer Maternal Uncle      Heart disease Maternal Grandmother      Colon cancer Maternal Grandfather      Heart disease Maternal Uncle      Diverticulitis Half-Brother

## 2025-06-26 NOTE — PATIENT INSTRUCTIONS
" Dysuria:   -The patients hx and sx are most consistent with an acute uncomplicated UTI. Will treat with Keflex taken as prescribed. Take with food and a probiotic. She has no fever, chills, body aches. No flank pain or CVA tenderness as per patient.  Natural food sources of probiotics are great like kimchi, sauerkraut, yogurt, kefir, pickles, sourdough and kombucha.   -U/A showed nitrites, blood and leukocytes and urine cx ordered today. Call in 48 hours for your results.   -We discussed Uqora supplements or D-Mannose for bladder health and prevention   -Warm heating pad on the abdomen or sitz bath for comfort  -Avoid bubble bath/bath oils. Caffeine and alcohol may irritate the bladder.   -Empty bladder immediately before and following intercourse. Avoid \"holding urine.\"  -AZO/Uricalm for pain control, do not take for more than 48 hours as this can mask worsening symptoms.   -Stay VERY well hydrated and push fluids. You want your urine clear.   -Prevention and precautions discussed  -If your sx worsen or persists, see your PCP or OBGYN immediately as discussed. Red flag signs discussed in depth.       "

## 2025-06-30 LAB
BACTERIA UR CULT: ABNORMAL
Lab: ABNORMAL
SL AMB ANTIMICROBIAL SUSCEPTIBILITY: ABNORMAL

## 2025-07-02 ENCOUNTER — TELEPHONE (OUTPATIENT)
Dept: FAMILY MEDICINE CLINIC | Facility: CLINIC | Age: 75
End: 2025-07-02

## 2025-07-02 NOTE — TELEPHONE ENCOUNTER
Patient needs a follow up for a fall with DR. Carnes. Patient stating she can ONLY come in between 5 and 7.  There are no evening apts.  Can we accommodate patient?      Please call to schedule.  Thank you